# Patient Record
Sex: FEMALE | Race: WHITE | NOT HISPANIC OR LATINO | Employment: OTHER | URBAN - METROPOLITAN AREA
[De-identification: names, ages, dates, MRNs, and addresses within clinical notes are randomized per-mention and may not be internally consistent; named-entity substitution may affect disease eponyms.]

---

## 2017-01-06 DIAGNOSIS — J30.2 SEASONAL ALLERGIC RHINITIS, UNSPECIFIED ALLERGIC RHINITIS TRIGGER: Primary | ICD-10-CM

## 2017-01-06 RX ORDER — FLUTICASONE PROPIONATE 50 MCG
2 SPRAY, SUSPENSION (ML) NASAL DAILY
Qty: 16 G | Refills: 2 | Status: SHIPPED | OUTPATIENT
Start: 2017-01-06 | End: 2017-04-04

## 2017-01-06 NOTE — TELEPHONE ENCOUNTER
Diagnosis J30.2 copied    Pending Prescriptions:                       Disp   Refills    fluticasone (FLONASE) 50 MCG/ACT spray    17 g   1            Sig: Spray 2 sprays into both nostrils daily          Last Written Prescription Date: 6-14-16  Last Fill Quantity: 1 bottle,  # refills: 1   Last Office Visit with [facility], P or [facility] prescribing provider: 10-31-16                                             RT Sohail HutchinsR)

## 2017-01-06 NOTE — TELEPHONE ENCOUNTER
Pres[place] per [facility] [place].  Tanja Hu RN

## 2017-01-12 ENCOUNTER — DOCUMENTATION ONLY (OUTPATIENT)
Dept: SLEEP MEDICINE | Facility: CLINIC | Age: 62
End: 2017-01-12

## 2017-01-12 NOTE — PROGRESS NOTES
Patient was offered choice of vendor and chose [facility].  Patient Cristina Milton was set up at [place] on January 12, 2017. Patient received a Debbie [employer] DreamStation Auto. Pressures were set at 5-15 cm H2O.   Patient s ramp is 5 cm H2O for Off and FLEX/EPR is 2.  Patient received a Resmed Mask name: AIRFIT F20  Nasal mask Size Medium, heated tubing and heated humidifier.  Patient is enrolled in the STM Program and does not need to meet compliance. Patient has a follow up on 3/2/17 with Dr. Slater.    Kierra Fernandez

## 2017-01-16 ENCOUNTER — DOCUMENTATION ONLY (OUTPATIENT)
Dept: SLEEP MEDICINE | Facility: CLINIC | Age: 62
End: 2017-01-16

## 2017-01-16 NOTE — PROGRESS NOTES
3 DAY STM VISIT    Patient contacted for 3 day STM visit  Subjective measures:  Things are going well.  No issues with pressure or comfort.    Some issues with congestion.  Discuss increase humidity.    Current settings:  EPAP Min Auto CPAP: 5 (CPAP Min Auto CPAP)       EPAP Max Auto CPAP: 15 (CPAP Max Auto CPAP)       Assessment:  Nightly usage over four hours.   Action plan: Pt to have f/u 14 day  STM visit.

## 2017-01-27 ENCOUNTER — DOCUMENTATION ONLY (OUTPATIENT)
Dept: SLEEP MEDICINE | Facility: CLINIC | Age: 62
End: 2017-01-27

## 2017-01-27 NOTE — PROGRESS NOTES
14 DAY [facility] VISIT    Message left for patient to return call     Assessment: Pt meeting objective benchmarks.     Action plan: Waiting for patient to return call.  and Pt to have 30 day STM visit.   Device settings:    EPAP Min Auto CPAP: 5 (CPAP Min Auto CPAP)    EPAP Max Auto CPAP: 15 (CPAP Max Auto CPAP)    Avg EPAP pressure (90th %ile) 14 day average (Debbie): 9.2cm H20    Objective measures: 14 day rolling measures       Compliance   (Goal >70%)  --% compliance greater than four hours rolling average 14 days: 85.7 %      Leak   (Goal < 10%)  --Average % of night in large leak Rolling Average 14 days (DEBBIE): 2%  last data upload      AHI  (Goal < 5)  --AHI Rolling Average 14 Day: 3.05   last data upload       Usage  (Goal >240)  --Time mask on face 14 day average: 464 min

## 2017-01-30 DIAGNOSIS — G47.33 OSA (OBSTRUCTIVE SLEEP APNEA): Primary | ICD-10-CM

## 2017-01-30 NOTE — PROGRESS NOTES
Patient returned call.    Subjective measures:  Score  1 Pt feeling benefit from therapy.  She is having some air hunger when starting the night.      Order placed to provider to increase auto to 7-15 cm H20

## 2017-02-13 ENCOUNTER — DOCUMENTATION ONLY (OUTPATIENT)
Dept: SLEEP MEDICINE | Facility: CLINIC | Age: 62
End: 2017-02-13

## 2017-03-02 ENCOUNTER — OFFICE VISIT (OUTPATIENT)
Dept: SLEEP MEDICINE | Facility: CLINIC | Age: 62
End: 2017-03-02
Payer: COMMERCIAL

## 2017-03-02 VITALS
SYSTOLIC BLOOD PRESSURE: 127 MMHG | HEART RATE: 69 BPM | OXYGEN SATURATION: 94 % | WEIGHT: 257.4 LBS | BODY MASS INDEX: 39.01 KG/M2 | DIASTOLIC BLOOD PRESSURE: 71 MMHG | HEIGHT: 68 IN | TEMPERATURE: 97.5 F

## 2017-03-02 DIAGNOSIS — G47.33 OSA (OBSTRUCTIVE SLEEP APNEA): Primary | ICD-10-CM

## 2017-03-02 PROCEDURE — 99213 OFFICE O/P EST LOW 20 MIN: CPT | Performed by: INTERNAL MEDICINE

## 2017-03-02 NOTE — MR AVS SNAPSHOT
After Visit Summary   3/2/2017    Cristina Milton    MRN: [id number]           Patient Information     Date Of Birth          1955        Visit Information        Provider Department      3/2/2017 2:30 PM Arnoldo Slater MD [facility]        Today's Diagnoses     ANGY (obstructive sleep apnea)    -  1      Care Instructions      Your BMI is Body mass index is 39.14 kg/(m^2).  Weight management is a personal decision.  If you are interested in exploring weight loss strategies, the following discussion covers the approaches that may be successful. Body mass index (BMI) is one way to tell whether you are at a healthy weight, overweight, or obese. It measures your weight in relation to your height.  A BMI of 18.5 to 24.9 is in the healthy range. A person with a BMI of 25 to 29.9 is considered overweight, and someone with a BMI of 30 or greater is considered obese. More than two-thirds of American adults are considered overweight or obese.  Being overweight or obese increases the risk for further weight gain. Excess weight may lead to heart disease and diabetes.  Creating and following plans for healthy eating and physical activity may help you improve your health.  Weight control is part of healthy lifestyle and includes exercise, emotional health, and healthy eating habits. Careful eating habits lifelong are the mainstay of weight control. Though there are significant health benefits from weight loss, long-term weight loss with diet alone may be very difficult to achieve- studies show long-term success with dietary management in less than 10% of people. Attaining a healthy weight may be especially difficult to achieve in those with severe obesity. In some cases, medications, devices and surgical management might be considered.  What can you do?  If you are overweight or obese and are interested in methods for weight loss, you should discuss this with your provider.     Consider  reducing daily calorie intake by 500 calories.     Keep a food journal.     Avoiding skipping meals, consider cutting portions instead.    Diet combined with exercise helps maintain muscle while optimizing fat loss. Strength training is particularly important for building and maintaining muscle mass. Exercise helps reduce stress, increase energy, and improves fitness. Increasing exercise without diet control, however, may not burn enough calories to loose weight.       Start walking three days a week 10-20 minutes at a time    Work towards walking thirty minutes five days a week     Eventually, increase the speed of your walking for 1-2 minutes at time    In addition, we recommend that you review healthy lifestyles and methods for weight loss available through the National Institutes of Health patient information sites:  http://win.niddk.nih.gov/publications/index.htm    And look into health and wellness programs that may be available through your health insurance provider, employer, local community center, or [employer].    Weight management plan: Patient was referred to their PCP to discuss a diet and exercise plan.            Follow-ups after your visit        Follow-up notes from your care team     Return in about 1 year (around 3/2/2018).      Who to contact     If you have questions or need follow up information about today's clinic visit or your schedule please contact [facility] directly at [phone].  Normal or non-critical lab and imaging results will be communicated to you by MyChart, letter or phone within 4 business days after the clinic has received the results. If you do not hear from us within 7 days, please contact the clinic through MyChart or phone. If you have a critical or abnormal lab result, we will notify you by phone as soon as possible.  Submit refill requests through [employer] or call your pharmacy and they will forward the refill request to us. Please allow 3  "business days for your refill to be completed.          Additional Information About Your Visit        MyChart Information     [employer] gives you secure access to your electronic health record. If you see a primary care provider, you can also send messages to your care team and make appointments. If you have questions, please call your primary care clinic.  If you do not have a primary care provider, please call [phone] and they will assist you.        Care EveryWhere ID     This is your Care EveryWhere ID. This could be used by other organizations to access your [place] medical records  [id number]        Your Vitals Were     Pulse Temperature Height Pulse Oximetry BMI (Body Mass Index)       69 97.5  F (36.4  C) (Oral) 1.727 m (5' 8\") 94% 39.14 kg/m2        Blood Pressure from Last 3 Encounters:   03/02/17 127/71   12/15/16 (!) 132/98   12/08/16 147/69    Weight from Last 3 Encounters:   03/02/17 116.8 kg (257 lb 6.4 oz)   12/08/16 117.8 kg (259 lb 9.6 oz)   10/31/16 117 kg (258 lb)              We Performed the Following     Comprehensive DME        Primary Care Provider Office Phone # Fax #    Mela ANGELA Link [phone]       [facility]    [address] [place] [facility]   [facility] [address]        Thank you!     Thank you for choosing [facility]  for your care. Our goal is always to provide you with excellent care. Hearing back from our patients is one way we can continue to improve our services. Please take a few minutes to complete the written survey that you may receive in the mail after your visit with us. Thank you!             Your Updated Medication List - Protect others around you: Learn how to safely use, store and throw away your medicines at www.disposemymeds.org.          This list is accurate as of: 3/2/17  3:31 PM.  Always use your most recent med list.                   Brand Name Dispense Instructions for use    acetaminophen 325 MG " tablet    TYLENOL    100 tablet    Take 2 tablets (650 mg) by mouth every 6 hours       * albuterol (2.5 MG/3ML) 0.083% neb solution      Take 1 vial by nebulization every 6 hours as needed for shortness of breath / dyspnea       * albuterol 108 (90 BASE) MCG/ACT Inhaler    PROAIR HFA/PROVENTIL HFA/VENTOLIN HFA    1 Inhaler    Inhale 2 puffs into the lungs every 4 hours as needed for shortness of breath / dyspnea or wheezing       beclomethasone 80 MCG/ACT Inhaler    QVAR    3 Inhaler    Inhale 2 puffs into the lungs 2 times daily       Biotin 1 MG Caps          calcium-vitamin D 600-400 MG-UNIT per tablet    CALTRATE     Take 2 tablets by mouth daily       citalopram 40 MG tablet    celeXA    90 tablet    Take 1 tablet (40 mg) by mouth daily       fluticasone 50 MCG/ACT spray    FLONASE    16 g    Spray 2 sprays into both nostrils daily       glucosamine-chondroitin 500-400 MG Caps per capsule      Take 2 capsules by mouth daily       montelukast 10 MG tablet    SINGULAIR    90 tablet    Take 1 tablet (10 mg) by mouth At Bedtime       MULTIPLE VITAMIN PO      Take 1 tablet by mouth daily       nitroglycerin 0.4 MG sublingual tablet    NITROSTAT    25 tablet    Place 1 tablet (0.4 mg) under the tongue every 5 minutes as needed for chest pain call 911 if not gone       [place] [facility]      DREAMSTATION 7-15CM/H20 NASAL AIRFIT F20 M       PREDNISONE PO          Vitamin D-3 Super Strength 2000 UNITS tablet   Generic drug:  cholecalciferol      Take 2,000 Units by mouth       * Notice:  This list has 2 medication(s) that are the same as other medications prescribed for you. Read the directions carefully, and ask your doctor or other care provider to review them with you.

## 2017-03-02 NOTE — PATIENT INSTRUCTIONS

## 2017-03-02 NOTE — PROGRESS NOTES
Name: Cristina Milton MRN# [id number]   Age: 62 year old YOB: 1955     Date : March 2, 2017  Primary care provider: Mela Link           Chief Complaint:    Follow up of sleep apnea            History of Present Illness:     Cristina Milton is a 62 year old female with severe obstructive sleep apnea. Her home sleep study from 12/10/2016 showed apnea hypopnea index of 48.4 per hour. Lowest O2 saturation was 825 and there was a total of 26.1 minutes with saturation less than 88%.     Patient has been started on auto PAP therapy. He has used treatment regularly and reports significant positive benefits from its use. She wakes up feeling more refreshed and has improvement in her daytime energy and alertness.     Review of her CPAP download shows regular compliance with 93% of last 30 days with use greater than 4 hours. Average daily use is 7 hours 29 minutes. Residual AHI is 2.6 per hour.             Medications:     Current Outpatient Prescriptions   Medication Sig     order for DME DREAMSTATION  7-15CM/H20  NASAL AIRFIT F20 M     fluticasone (FLONASE) 50 MCG/ACT spray Spray 2 sprays into both nostrils daily     Biotin 1 MG CAPS      citalopram (CELEXA) 40 MG tablet Take 1 tablet (40 mg) by mouth daily     beclomethasone (QVAR) 80 MCG/ACT Inhaler Inhale 2 puffs into the lungs 2 times daily     montelukast (SINGULAIR) 10 MG tablet Take 1 tablet (10 mg) by mouth At Bedtime     albuterol (PROAIR HFA, PROVENTIL HFA, VENTOLIN HFA) 108 (90 BASE) MCG/ACT inhaler Inhale 2 puffs into the lungs every 4 hours as needed for shortness of breath / dyspnea or wheezing     PREDNISONE PO      nitroglycerin (NITROSTAT) 0.4 MG SL tablet Place 1 tablet (0.4 mg) under the tongue every 5 minutes as needed for chest pain call 911 if not gone     cholecalciferol (VITAMIN D-3 SUPER STRENGTH) 2000 UNITS tablet Take 2,000 Units by mouth     acetaminophen (TYLENOL) 325 MG tablet Take 2 tablets (650 mg) by mouth every 6  hours     albuterol (2.5 MG/3ML) 0.083% nebulizer solution Take 1 vial by nebulization every 6 hours as needed for shortness of breath / dyspnea     calcium-vitamin D (CALTRATE) 600-400 MG-UNIT per tablet Take 2 tablets by mouth daily     MULTIPLE VITAMIN PO Take 1 tablet by mouth daily     glucosamine-chondroitin 500-400 MG CAPS Take 2 capsules by mouth daily      No current facility-administered medications for this visit.         Allergies   Allergen Reactions     Cats      Dogs      Dust Mites      Mold      Nsaids      Gastric ulcer     Trees             Past Medical History:     Does not need 02 supplement at night   Past Medical History   Diagnosis Date     Allergic rhinitis due to pollen      Anxiety disorder      Arthritis      Asthma      Depression      since 1998 was on zoloft      Diaphragmatic hernia without mention of obstruction or gangrene      Disorder of bone and cartilage, unspecified      osteopenia     Headache(784.0)      Insomnia, unspecified      Lumbago      Malignant neoplasm of central portion of female breast (H)      Obesity, unspecified      Obstructive sleep apnea (adult) (pediatric)      Osteoarthrosis, unspecified whether generalized or localized, lower leg      Other diseases of lung, not elsewhere classified      PONV (postoperative nausea and vomiting)      vomiting     Recurrent sinus infections      Stomach ulcer      NSAID use     TMJ disease      Unspecified asthma(493.90)      Unspecified nasal polyp      Unspecified vitamin D deficiency              Past Surgical History:    No h/o  upper airway surgery  Past Surgical History   Procedure Laterality Date     Laparoscopic cholecystectomy  11/19/2012     Procedure: LAPAROSCOPIC CHOLECYSTECTOMY;  LAPAROSCOPIC CHOLECYSTECTOMY;  Surgeon: Jama Persaud MD;  Location: [facility]     Colonoscopy  2006,2008     Tonsillectomy       Ent surgery  2012     dilation esoph stricture     Breast surgery  1995     lumpectomy,early cancer      "Arthroplasty knee Right 2/17/2015     Procedure: ARTHROPLASTY KNEE;  Surgeon: Jermain Gonzalez MD;  Location:  OR     Gyn surgery  1999     hysterectomy with ooperectomy     Gyn surgery  1995     conization cervix knife/laser     Hysterectomy total abdominal       Colonoscopy N/A 12/15/2016     Procedure: COMBINED COLONOSCOPY, SINGLE OR MULTIPLE BIOPSY/POLYPECTOMY BY BIOPSY;  Surgeon: Gil Sanchez MD;  Location:  GI            Physical Examination:   /71 (BP Location: Right arm, Patient Position: Chair, Cuff Size: Adult Regular)  Pulse 69  Temp 97.5  F (36.4  C) (Oral)  Ht 1.727 m (5' 8\")  Wt 116.8 kg (257 lb 6.4 oz)  SpO2 94%  BMI 39.14 kg/m2            Assessment and Plan:     1. Severe obstructive sleep apnea, adequately treated on CPAP    - patient has adequate treatment of her sleep apnea on current CPAP therapy. She complains of some air hunger and wanted an increase in her min EPAP which was changed to 8 cm h2O.     - She was counseled regarding download findings and benefits of ongoing treatment of her severe sleep apnea.     Plan:     1. Continue auto PAP therapy 8-15 cm H2O  2. Follow up karan year        I spent a total of 15 minutes with patient with more than 50% in counseling       Arnoldo Slater MD, MD 3/2/2017       "

## 2017-03-02 NOTE — NURSING NOTE
"Chief Complaint   Patient presents with     Sleep Problem     CPAP follow        Initial /71 (BP Location: Right arm, Patient Position: Chair, Cuff Size: Adult Regular)  Pulse 69  Temp 97.5  F (36.4  C) (Oral)  Ht 1.727 m (5' 8\")  Wt 116.8 kg (257 lb 6.4 oz)  SpO2 94%  BMI 39.14 kg/m2 Estimated body mass index is 39.14 kg/(m^2) as calculated from the following:    Height as of this encounter: 1.727 m (5' 8\").    Weight as of this encounter: 116.8 kg (257 lb 6.4 oz).  Medication Reconciliation: complete     Julia Bates MA  [facility]      "

## 2017-04-02 ENCOUNTER — OFFICE VISIT (OUTPATIENT)
Dept: URGENT CARE | Facility: URGENT CARE | Age: 62
End: 2017-04-02
Payer: COMMERCIAL

## 2017-04-02 VITALS
OXYGEN SATURATION: 95 % | DIASTOLIC BLOOD PRESSURE: 60 MMHG | SYSTOLIC BLOOD PRESSURE: 110 MMHG | TEMPERATURE: 98.4 F | BODY MASS INDEX: 38.55 KG/M2 | HEART RATE: 85 BPM | HEIGHT: 68 IN | WEIGHT: 254.4 LBS

## 2017-04-02 DIAGNOSIS — R06.2 WHEEZING: ICD-10-CM

## 2017-04-02 DIAGNOSIS — R05.8 PRODUCTIVE COUGH: Primary | ICD-10-CM

## 2017-04-02 PROCEDURE — 94640 AIRWAY INHALATION TREATMENT: CPT | Performed by: PHYSICIAN ASSISTANT

## 2017-04-02 PROCEDURE — 99214 OFFICE O/P EST MOD 30 MIN: CPT | Mod: 25 | Performed by: PHYSICIAN ASSISTANT

## 2017-04-02 RX ORDER — ALBUTEROL SULFATE 0.83 MG/ML
1 SOLUTION RESPIRATORY (INHALATION) EVERY 6 HOURS PRN
Qty: 25 VIAL | Refills: 0 | Status: SHIPPED | OUTPATIENT
Start: 2017-04-02 | End: 2017-11-30

## 2017-04-02 RX ORDER — IPRATROPIUM BROMIDE AND ALBUTEROL SULFATE 2.5; .5 MG/3ML; MG/3ML
SOLUTION RESPIRATORY (INHALATION)
Qty: 1 VIAL | Refills: 0
Start: 2017-04-02 | End: 2017-04-13

## 2017-04-02 RX ORDER — PREDNISONE 20 MG/1
20 TABLET ORAL 2 TIMES DAILY
Qty: 10 TABLET | Refills: 0 | Status: SHIPPED | OUTPATIENT
Start: 2017-04-02 | End: 2017-04-13

## 2017-04-02 RX ORDER — DOXYCYCLINE HYCLATE 100 MG
100 TABLET ORAL 2 TIMES DAILY
Qty: 20 TABLET | Refills: 0 | Status: SHIPPED | OUTPATIENT
Start: 2017-04-02 | End: 2017-04-13

## 2017-04-02 NOTE — PROGRESS NOTES
SUBJECTIVE:   Cristina Milton is a 62 year old female presenting with a chief complaint of   1) productive cough for the past 6 days, worsening.  2) wheezing  3) nasal congestion  Onset of symptoms was as above.  Course of illness is worsening.    Severity moderate  Current and Associated symptoms: as above.  Chills, no fever noted  Treatment measures tried include albuterol nebulizer.  Predisposing factors include HX of asthma.    Past Medical History:   Diagnosis Date     Allergic rhinitis due to pollen      Anxiety disorder      Arthritis      Asthma      Depression     since 1998 was on zoloft      Diaphragmatic hernia without mention of obstruction or gangrene      Disorder of bone and cartilage, unspecified     osteopenia     Headache(784.0)      Insomnia, unspecified      Lumbago      Malignant neoplasm of central portion of female breast (H)      Obesity, unspecified      Obstructive sleep apnea (adult) (pediatric)      Osteoarthrosis, unspecified whether generalized or localized, lower leg      Other diseases of lung, not elsewhere classified      PONV (postoperative nausea and vomiting)     vomiting     Recurrent sinus infections      Stomach ulcer     NSAID use     TMJ disease      Unspecified asthma(493.90)      Unspecified nasal polyp      Unspecified vitamin D deficiency      Patient Active Problem List   Diagnosis     Arthritis of knee, right     Advanced directives, counseling/discussion     Asthma     Right shoulder pain     Depression     Obstructive sleep apnea (adult) (pediatric)     Tubular adenoma     Social History   Substance Use Topics     Smoking status: Never Smoker     Smokeless tobacco: Never Used     Alcohol use No      Comment: rare       ROS:  CONSTITUTIONAL:NEGATIVE for fever, chills, change in weight  INTEGUMENTARY/SKIN: NEGATIVE for worrisome rashes, moles or lesions  EYES: NEGATIVE for vision changes or irritation  ENT/MOUTH: as per HPI  RESP:as per HPI  CV: NEGATIVE for chest  "pain, palpitations or peripheral edema  GI: NEGATIVE for nausea, abdominal pain, heartburn, or change in bowel habits  MUSCULOSKELETAL: NEGATIVE for significant arthralgias or myalgia    OBJECTIVE  :/60  Pulse 85  Temp 98.4  F (36.9  C)  Ht 5' 8\" (1.727 m)  Wt 254 lb 6.4 oz (115.4 kg)  SpO2 95%  BMI 38.68 kg/m2  GENERAL APPEARANCE: healthy, alert and no distress  EYES: EOMI,  PERRL, conjunctiva clear  HENT: ear canals and TM's normal.  Nose and mouth without ulcers, erythema or lesions  NECK: supple, nontender, no lymphadenopathy  RESP: wheezing throughout which improves but does not clear with neb completely in clinic  CV: regular rates and rhythm, normal S1 S2, no murmur noted  ABDOMEN:  soft, nontender, no HSM or masses and bowel sounds normal  NEURO: Normal strength and tone, sensory exam grossly normal,  normal speech and mentation  SKIN: no suspicious lesions or rashes    (R05) Productive cough  (primary encounter diagnosis)  Comment:   Plan: doxycycline (VIBRA-TABS) 100 MG tablet            (R06.2) Wheezing  Comment:   Plan: INHALATION/NEBULIZER TREATMENT, INITIAL,         ipratropium - albuterol 0.5 mg/2.5 mg/3 mL         (DUONEB) 0.5-2.5 (3) MG/3ML neb solution,         predniSONE (DELTASONE) 20 MG tablet, albuterol         (2.5 MG/3ML) 0.083% neb solution          F/u with PCP for re-check within 2 weeks, sooner should symptoms persist or worsen.    Patient expresses understanding and agreement with the assessment and plan as above.      "

## 2017-04-02 NOTE — MR AVS SNAPSHOT
"              After Visit Summary   4/2/2017    Cristina Milton    MRN: [id number]           Patient Information     Date Of Birth          1955        Visit Information        Provider Department      4/2/2017 1:15 PM Lashae Contreras PA-C [facility]        Today's Diagnoses     Productive cough    -  1    Wheezing           Follow-ups after your visit        Who to contact     If you have questions or need follow up information about today's clinic visit or your schedule please contact [facility] directly at [phone].  Normal or non-critical lab and imaging results will be communicated to you by MyChart, letter or phone within 4 business days after the clinic has received the results. If you do not hear from us within 7 days, please contact the clinic through Narrativehart or phone. If you have a critical or abnormal lab result, we will notify you by phone as soon as possible.  Submit refill requests through [employer] or call your pharmacy and they will forward the refill request to us. Please allow 3 business days for your refill to be completed.          Additional Information About Your Visit        MyChart Information     [employer] gives you secure access to your electronic health record. If you see a primary care provider, you can also send messages to your care team and make appointments. If you have questions, please call your primary care clinic.  If you do not have a primary care provider, please call [phone] and they will assist you.        Care EveryWhere ID     This is your Care EveryWhere ID. This could be used by other organizations to access your [place] medical records  [id number]        Your Vitals Were     Pulse Temperature Height Pulse Oximetry BMI (Body Mass Index)       85 98.4  F (36.9  C) 5' 8\" (1.727 m) 95% 38.68 kg/m2        Blood Pressure from Last 3 Encounters:   04/02/17 110/60   03/02/17 127/71   12/15/16 (!) " 132/98    Weight from Last 3 Encounters:   04/02/17 254 lb 6.4 oz (115.4 kg)   03/02/17 257 lb 6.4 oz (116.8 kg)   12/08/16 259 lb 9.6 oz (117.8 kg)              We Performed the Following     INHALATION/NEBULIZER TREATMENT, INITIAL          Today's Medication Changes          These changes are accurate as of: 4/2/17  4:02 PM.  If you have any questions, ask your nurse or doctor.               Start taking these medicines.        Dose/Directions    doxycycline 100 MG tablet   Commonly known as:  VIBRA-TABS   Used for:  Productive cough   Started by:  Lashae Contreras PA-C        Dose:  100 mg   Take 1 tablet (100 mg) by mouth 2 times daily   Quantity:  20 tablet   Refills:  0       ipratropium - albuterol 0.5 mg/2.5 mg/3 mL 0.5-2.5 (3) MG/3ML neb solution   Commonly known as:  DUONEB   Used for:  Wheezing   Started by:  Lashae Contreras PA-C        One in neb in clinic   Quantity:  1 vial   Refills:  0       predniSONE 20 MG tablet   Commonly known as:  DELTASONE   Used for:  Wheezing   Started by:  Lashae Contreras PA-C        Dose:  20 mg   Take 1 tablet (20 mg) by mouth 2 times daily   Quantity:  10 tablet   Refills:  0         These medicines have changed or have updated prescriptions.        Dose/Directions    * albuterol (2.5 MG/3ML) 0.083% neb solution   This may have changed:  Another medication with the same name was added. Make sure you understand how and when to take each.        Dose:  1 vial   Take 1 vial by nebulization every 6 hours as needed for shortness of breath / dyspnea   Refills:  0       * albuterol 108 (90 BASE) MCG/ACT Inhaler   Commonly known as:  PROAIR HFA/PROVENTIL HFA/VENTOLIN HFA   This may have changed:  Another medication with the same name was added. Make sure you understand how and when to take each.   Used for:  Mild intermittent asthma without complication   Changed by:  Mela Link MD        Dose:  2 puff   Inhale 2 puffs into the lungs every  4 hours as needed for shortness of breath / dyspnea or wheezing   Quantity:  1 Inhaler   Refills:  3       * albuterol (2.5 MG/3ML) 0.083% neb solution   This may have changed:  You were already taking a medication with the same name, and this prescription was added. Make sure you understand how and when to take each.   Used for:  Wheezing   Changed by:  Lashae Contreras PA-C        Dose:  1 vial   Take 1 vial (2.5 mg) by nebulization every 6 hours as needed for shortness of breath / dyspnea or wheezing   Quantity:  25 vial   Refills:  0       * Notice:  This list has 3 medication(s) that are the same as other medications prescribed for you. Read the directions carefully, and ask your doctor or other care provider to review them with you.         Where to get your medicines      These medications were sent to [address] [address]     Phone:  [phone]     albuterol (2.5 MG/3ML) 0.083% neb solution    doxycycline 100 MG tablet    predniSONE 20 MG tablet         Some of these will need a paper prescription and others can be bought over the counter.  Ask your nurse if you have questions.     You don't need a prescription for these medications     ipratropium - albuterol 0.5 mg/2.5 mg/3 mL 0.5-2.5 (3) MG/3ML neb solution                Primary Care Provider Office Phone # Fax #    Mela Link [phone]       [place] [address] [address] [facility] [[email or web address]] [address]  [facility] [address]        Thank you!     Thank you for choosing [facility]  for your care. Our goal is always to provide you with excellent care. Hearing back from our patients is one way we can continue to improve our services. Please take a few minutes to complete the written survey that you may receive in the mail after your visit with us. Thank you!             Your Updated Medication List - Protect  others around you: Learn how to safely use, store and throw away your medicines at www.disposemymeds.org.          This list is accurate as of: 4/2/17  4:02 PM.  Always use your most recent med list.                   Brand Name Dispense Instructions for use    acetaminophen 325 MG tablet    TYLENOL    100 tablet    Take 2 tablets (650 mg) by mouth every 6 hours       * albuterol (2.5 MG/3ML) 0.083% neb solution      Take 1 vial by nebulization every 6 hours as needed for shortness of breath / dyspnea       * albuterol 108 (90 BASE) MCG/ACT Inhaler    PROAIR HFA/PROVENTIL HFA/VENTOLIN HFA    1 Inhaler    Inhale 2 puffs into the lungs every 4 hours as needed for shortness of breath / dyspnea or wheezing       * albuterol (2.5 MG/3ML) 0.083% neb solution     25 vial    Take 1 vial (2.5 mg) by nebulization every 6 hours as needed for shortness of breath / dyspnea or wheezing       beclomethasone 80 MCG/ACT Inhaler    QVAR    3 Inhaler    Inhale 2 puffs into the lungs 2 times daily       Biotin 1 MG Caps      Take by mouth as needed       calcium-vitamin D 600-400 MG-UNIT per tablet    CALTRATE     Take 2 tablets by mouth daily       citalopram 40 MG tablet    celeXA    90 tablet    Take 1 tablet (40 mg) by mouth daily       doxycycline 100 MG tablet    VIBRA-TABS    20 tablet    Take 1 tablet (100 mg) by mouth 2 times daily       fluticasone 50 MCG/ACT spray    FLONASE    16 g    Spray 2 sprays into both nostrils daily       glucosamine-chondroitin 500-400 MG Caps per capsule      Take 2 capsules by mouth daily       ipratropium - albuterol 0.5 mg/2.5 mg/3 mL 0.5-2.5 (3) MG/3ML neb solution    DUONEB    1 vial    One in neb in clinic       montelukast 10 MG tablet    SINGULAIR    90 tablet    Take 1 tablet (10 mg) by mouth At Bedtime       MULTIPLE VITAMIN PO      Take 1 tablet by mouth daily       nitroglycerin 0.4 MG sublingual tablet    NITROSTAT    25 tablet    Place 1 tablet (0.4 mg) under the tongue every 5  minutes as needed for chest pain call 911 if not gone       predniSONE 20 MG tablet    DELTASONE    10 tablet    Take 1 tablet (20 mg) by mouth 2 times daily       Vitamin D-3 Super Strength 2000 UNITS tablet   Generic drug:  cholecalciferol      Take 2,000 Units by mouth       * Notice:  This list has 3 medication(s) that are the same as other medications prescribed for you. Read the directions carefully, and ask your doctor or other care provider to review them with you.

## 2017-04-02 NOTE — LETTER
[place] [facility]  [address] [address]  [phone]      April 2, 2017    RE:  Cristina Milton                                                                                                                                                       [address] [place] [id number]            To whom it may concern:    Cristina Milton was seen in clinic today for illness.  She may return to work on Tuesday.            Sincerely,        Lashae Vergara [facility]

## 2017-04-04 DIAGNOSIS — J30.2 SEASONAL ALLERGIC RHINITIS, UNSPECIFIED ALLERGIC RHINITIS TRIGGER: ICD-10-CM

## 2017-04-05 RX ORDER — FLUTICASONE PROPIONATE 50 MCG
SPRAY, SUSPENSION (ML) NASAL
Qty: 16 ML | Refills: 5 | Status: SHIPPED | OUTPATIENT
Start: 2017-04-05 | End: 2017-09-20

## 2017-04-05 NOTE — TELEPHONE ENCOUNTER
fluticasone (FLONASE) 50 MCG/ACT spray        Last Written Prescription Date: 1/6/2017  Last Fill Quantity: 16g,  # refills: 2   Last Office Visit with [facility], [facility] or [facility] prescribing provider: 10/31/2016

## 2017-04-05 NOTE — TELEPHONE ENCOUNTER
Prescription approved per [facility], [facility] or [facility] refill protocol.  Caitie Lawson RN  [employer]

## 2017-04-13 ENCOUNTER — OFFICE VISIT (OUTPATIENT)
Dept: FAMILY MEDICINE | Facility: CLINIC | Age: 62
End: 2017-04-13
Payer: COMMERCIAL

## 2017-04-13 VITALS
RESPIRATION RATE: 18 BRPM | TEMPERATURE: 97.5 F | SYSTOLIC BLOOD PRESSURE: 123 MMHG | DIASTOLIC BLOOD PRESSURE: 86 MMHG | BODY MASS INDEX: 38.95 KG/M2 | HEIGHT: 68 IN | HEART RATE: 64 BPM | WEIGHT: 257 LBS | OXYGEN SATURATION: 96 %

## 2017-04-13 DIAGNOSIS — J45.20 MILD INTERMITTENT ASTHMA WITHOUT COMPLICATION: Primary | ICD-10-CM

## 2017-04-13 DIAGNOSIS — E66.01 MORBID OBESITY DUE TO EXCESS CALORIES (H): ICD-10-CM

## 2017-04-13 DIAGNOSIS — J06.9 UPPER RESPIRATORY TRACT INFECTION, UNSPECIFIED TYPE: ICD-10-CM

## 2017-04-13 PROCEDURE — 99213 OFFICE O/P EST LOW 20 MIN: CPT | Performed by: INTERNAL MEDICINE

## 2017-04-13 NOTE — PATIENT INSTRUCTIONS
Follow up at the time of physical  Seek sooner medical attention if there is any worsening of symptoms or problems.

## 2017-04-13 NOTE — PROGRESS NOTES
SUBJECTIVE:                                                    Cristina Milton is a 62 year old female who presents to clinic today for the following health issues:      ED/UC Followup:    Facility:  [facility]  Date of visit: 4/2/2017  Reason for visit: Cough, Wheezing  Current Status: remarkably improved     Reviewed 4/2/17  UC note   She just finished doxycycline and prednisone course yesterday   States her symptoms have resolved and she feels much better       ACT Total Scores 9/18/2015 10/31/2016   ACT TOTAL SCORE (Goal Greater than or Equal to 20) 14 18   In the past 12 months, how many times did you visit the emergency room for your asthma without being admitted to the hospital? 0 0   In the past 12 months, how many times were you hospitalized overnight because of your asthma? 0 0          Problem list and histories reviewed & adjusted, as indicated.  Additional history: as documented    Patient Active Problem List   Diagnosis     Arthritis of knee, right     Advanced directives, counseling/discussion     Asthma     Right shoulder pain     Depression     Obstructive sleep apnea (adult) (pediatric)     Tubular adenoma     Past Surgical History:   Procedure Laterality Date     ARTHROPLASTY KNEE Right 2/17/2015    Procedure: ARTHROPLASTY KNEE;  Surgeon: Jermain Gonzalez MD;  Location:  OR     BREAST SURGERY  1995    lumpectomy,early cancer     COLONOSCOPY  2006,2008     COLONOSCOPY N/A 12/15/2016    Procedure: COMBINED COLONOSCOPY, SINGLE OR MULTIPLE BIOPSY/POLYPECTOMY BY BIOPSY;  Surgeon: Gil Sanchez MD;  Location:  GI     ENT SURGERY  2012    dilation esoph stricture     GYN SURGERY  1999    hysterectomy with ooperectomy     GYN SURGERY  1995    conization cervix knife/laser     HYSTERECTOMY TOTAL ABDOMINAL       LAPAROSCOPIC CHOLECYSTECTOMY  11/19/2012    Procedure: LAPAROSCOPIC CHOLECYSTECTOMY;  LAPAROSCOPIC CHOLECYSTECTOMY;  Surgeon: Jama Persaud MD;  Location: [facility]      TONSILLECTOMY         Social History   Substance Use Topics     Smoking status: Never Smoker     Smokeless tobacco: Never Used     Alcohol use No      Comment: rare     Family History   Problem Relation Age of Onset     Prostate Cancer Father      Lymphoma Sister      Family History Negative Mother      Colon Cancer Maternal Grandfather      Chronic Obstructive Pulmonary Disease Brother      Lymphoma Maternal Grandmother      Breast Cancer No family hx of          Current Outpatient Prescriptions   Medication Sig Dispense Refill     fluticasone (FLONASE) 50 MCG/ACT spray SHAKE LIQUID AND USE 2 SPRAYS IN EACH NOSTRIL DAILY 16 mL 5     albuterol (2.5 MG/3ML) 0.083% neb solution Take 1 vial (2.5 mg) by nebulization every 6 hours as needed for shortness of breath / dyspnea or wheezing 25 vial 0     Biotin 1 MG CAPS Take by mouth as needed        citalopram (CELEXA) 40 MG tablet Take 1 tablet (40 mg) by mouth daily 90 tablet 3     beclomethasone (QVAR) 80 MCG/ACT Inhaler Inhale 2 puffs into the lungs 2 times daily 3 Inhaler 3     montelukast (SINGULAIR) 10 MG tablet Take 1 tablet (10 mg) by mouth At Bedtime 90 tablet 3     albuterol (PROAIR HFA, PROVENTIL HFA, VENTOLIN HFA) 108 (90 BASE) MCG/ACT inhaler Inhale 2 puffs into the lungs every 4 hours as needed for shortness of breath / dyspnea or wheezing 1 Inhaler 3     nitroglycerin (NITROSTAT) 0.4 MG SL tablet Place 1 tablet (0.4 mg) under the tongue every 5 minutes as needed for chest pain call 911 if not gone 25 tablet 0     cholecalciferol (VITAMIN D-3 SUPER STRENGTH) 2000 UNITS tablet Take 2,000 Units by mouth       acetaminophen (TYLENOL) 325 MG tablet Take 2 tablets (650 mg) by mouth every 6 hours 100 tablet 0     albuterol (2.5 MG/3ML) 0.083% nebulizer solution Take 1 vial by nebulization every 6 hours as needed for shortness of breath / dyspnea       calcium-vitamin D (CALTRATE) 600-400 MG-UNIT per tablet Take 2 tablets by mouth daily       MULTIPLE VITAMIN PO  "Take 1 tablet by mouth daily       glucosamine-chondroitin 500-400 MG CAPS Take 2 capsules by mouth daily        Allergies   Allergen Reactions     Cats      Dogs      Dust Mites      Mold      Nsaids      Gastric ulcer     Trees        ROS:  Constitutional, neuro, ENT, endocrine, pulmonary, cardiac, gastrointestinal, genitourinary, musculoskeletal, integument and psychiatric systems are negative, except as otherwise noted.    This document serves as a record of the services and decisions personally performed and made by Mela Link MD. It was created on her behalf by Kindra Singh, a trained medical scribe. The creation of this document is based the provider's statements to the medical scribe.    Scrraz Singh 4:50 PM, April 13, 2017    OBJECTIVE:                                                    /86 (BP Location: Right arm, Patient Position: Right side, Cuff Size: Adult Large)  Pulse 64  Temp 97.5  F (36.4  C) (Oral)  Resp 18  Ht 5' 8\" (1.727 m)  Wt 257 lb (116.6 kg)  SpO2 96%  BMI 39.08 kg/m2  Body mass index is 39.08 kg/(m^2).    GENERAL APPEARANCE: obese, alert and no distress  EYES: Eyes grossly normal to inspection, PERRL and conjunctivae and sclerae normal  HENT: ear canals and TM's normal and nose and mouth without ulcers or lesions  NECK: no adenopathy  RESP: lungs clear to auscultation - no rales, rhonchi or wheezes  CV: regular rates and rhythm, normal S1 S2, no S3       ASSESSMENT/PLAN:                                                    Cristina was seen today for er f/u.    Diagnoses and all orders for this visit:    Mild intermittent asthma without complication  Improved. Her ACT score today was 9 due to recent exacerbation but patient states she is feeling very well. Has not been using rescue inhaler   Using qvar  Discussed with patient about the use of this    Upper respiratory tract infection, unspecified type  Resolved. Doing well     Morbid obesity due to excess calories " (H)  Discussed importance of healthy diet and exercise     Patient Instructions   Follow up at the time of physical  Seek sooner medical attention if there is any worsening of symptoms or problems.    The information in this document, created by the medical scribe for me, accurately reflects the services I personally performed and the decisions made by me. I have reviewed and approved this document for accuracy prior to leaving the patient care area.  Mela Link MD  4:56 PM, 04/13/17    Mela Lnik MD  [facility]

## 2017-04-13 NOTE — MR AVS SNAPSHOT
After Visit Summary   4/13/2017    Cristina Milton    MRN: [id number]           Patient Information     Date Of Birth          1955        Visit Information        Provider Department      4/13/2017 4:30 PM Mela Link MD [facility]        Today's Diagnoses     Mild intermittent asthma without complication    -  1    Upper respiratory tract infection, unspecified type        Morbid obesity due to excess calories (H)          Care Instructions    Follow up at the time of physical  Seek sooner medical attention if there is any worsening of symptoms or problems.          Follow-ups after your visit        Who to contact     If you have questions or need follow up information about today's clinic visit or your schedule please contact [facility] directly at [phone].  Normal or non-critical lab and imaging results will be communicated to you by [employer]or[employer], letter or phone within 4 business days after the clinic has received the results. If you do not hear from us within 7 days, please contact the clinic through [employer] or phone. If you have a critical or abnormal lab result, we will notify you by phone as soon as possible.  Submit refill requests through [employer] or call your pharmacy and they will forward the refill request to us. Please allow 3 business days for your refill to be completed.          Additional Information About Your Visit        MyChart Information     [employer] gives you secure access to your electronic health record. If you see a primary care provider, you can also send messages to your care team and make appointments. If you have questions, please call your primary care clinic.  If you do not have a primary care provider, please call [phone] and they will assist you.        Care EveryWhere ID     This is your Care EveryWhere ID. This could be used by other organizations to access your [place] medical records  [id number]        Your Vitals Were  "    Pulse Temperature Respirations Height Pulse Oximetry BMI (Body Mass Index)    64 97.5  F (36.4  C) (Oral) 18 5' 8\" (1.727 m) 96% 39.08 kg/m2       Blood Pressure from Last 3 Encounters:   04/13/17 123/86   04/02/17 110/60   03/02/17 127/71    Weight from Last 3 Encounters:   04/13/17 257 lb (116.6 kg)   04/02/17 254 lb 6.4 oz (115.4 kg)   03/02/17 257 lb 6.4 oz (116.8 kg)              Today, you had the following     No orders found for display       Primary Care Provider Office Phone # Fax #    Mela Link [phone]       [facility]    [address] [facility] [address]  [facility] [address]        Thank you!     Thank you for choosing [facility]  for your care. Our goal is always to provide you with excellent care. Hearing back from our patients is one way we can continue to improve our services. Please take a few minutes to complete the written survey that you may receive in the mail after your visit with us. Thank you!             Your Updated Medication List - Protect others around you: Learn how to safely use, store and throw away your medicines at www.disposemymeds.org.          This list is accurate as of: 4/13/17  4:54 PM.  Always use your most recent med list.                   Brand Name Dispense Instructions for use    acetaminophen 325 MG tablet    TYLENOL    100 tablet    Take 2 tablets (650 mg) by mouth every 6 hours       * albuterol (2.5 MG/3ML) 0.083% neb solution      Take 1 vial by nebulization every 6 hours as needed for shortness of breath / dyspnea       * albuterol 108 (90 BASE) MCG/ACT Inhaler    PROAIR HFA/PROVENTIL HFA/VENTOLIN HFA    1 Inhaler    Inhale 2 puffs into the lungs every 4 hours as needed for shortness of breath / dyspnea or wheezing       * albuterol (2.5 MG/3ML) 0.083% neb solution     25 vial    Take 1 vial (2.5 mg) by nebulization every 6 hours as needed for shortness of breath / dyspnea or wheezing       beclomethasone 80 " MCG/ACT Inhaler    QVAR    3 Inhaler    Inhale 2 puffs into the lungs 2 times daily       Biotin 1 MG Caps      Take by mouth as needed       calcium-vitamin D 600-400 MG-UNIT per tablet    CALTRATE     Take 2 tablets by mouth daily       citalopram 40 MG tablet    celeXA    90 tablet    Take 1 tablet (40 mg) by mouth daily       fluticasone 50 MCG/ACT spray    FLONASE    16 mL    SHAKE LIQUID AND USE 2 SPRAYS IN EACH NOSTRIL DAILY       glucosamine-chondroitin 500-400 MG Caps per capsule      Take 2 capsules by mouth daily       montelukast 10 MG tablet    SINGULAIR    90 tablet    Take 1 tablet (10 mg) by mouth At Bedtime       MULTIPLE VITAMIN PO      Take 1 tablet by mouth daily       nitroglycerin 0.4 MG sublingual tablet    NITROSTAT    25 tablet    Place 1 tablet (0.4 mg) under the tongue every 5 minutes as needed for chest pain call 911 if not gone       Vitamin D-3 Super Strength 2000 UNITS tablet   Generic drug:  cholecalciferol      Take 2,000 Units by mouth       * Notice:  This list has 3 medication(s) that are the same as other medications prescribed for you. Read the directions carefully, and ask your doctor or other care provider to review them with you.

## 2017-04-13 NOTE — NURSING NOTE
"Chief Complaint   Patient presents with     ER F/U       Initial /86 (BP Location: Right arm, Patient Position: Right side, Cuff Size: Adult Large)  Pulse 64  Temp 97.5  F (36.4  C) (Oral)  Resp 18  Ht 5' 8\" (1.727 m)  Wt 257 lb (116.6 kg)  SpO2 96%  BMI 39.08 kg/m2 Estimated body mass index is 39.08 kg/(m^2) as calculated from the following:    Height as of this encounter: 5' 8\" (1.727 m).    Weight as of this encounter: 257 lb (116.6 kg).  Medication Reconciliation: complete   Mary Kate Robles CMA (AAMA)      "

## 2017-04-14 ASSESSMENT — ASTHMA QUESTIONNAIRES: ACT_TOTALSCORE: 9

## 2017-04-14 ASSESSMENT — PATIENT HEALTH QUESTIONNAIRE - PHQ9: SUM OF ALL RESPONSES TO PHQ QUESTIONS 1-9: 4

## 2017-08-11 ENCOUNTER — TELEPHONE (OUTPATIENT)
Dept: FAMILY MEDICINE | Facility: CLINIC | Age: 62
End: 2017-08-11

## 2017-08-11 DIAGNOSIS — J45.901 ASTHMA EXACERBATION: Primary | ICD-10-CM

## 2017-08-11 RX ORDER — PREDNISONE 10 MG/1
TABLET ORAL
Qty: 30 TABLET | Refills: 0 | Status: SHIPPED | OUTPATIENT
Start: 2017-08-11 | End: 2017-10-01

## 2017-08-11 NOTE — TELEPHONE ENCOUNTER
Please triage this patient and see how bad her symptoms are?  I have full schedule this afternoon but if needed I can accommodate her at 4:15 pm and she can check in at 4 .  Dr.Nasima Nikolay MD

## 2017-08-11 NOTE — TELEPHONE ENCOUNTER
TO PCP:  Please see below message.  Would you like to see patient this afternoon?  Thank you.  Helen Wilcox RN

## 2017-08-11 NOTE — TELEPHONE ENCOUNTER
"PCP:    Pt is requesting a \"boost of prednisone\" to treat a current asthma exasperation.   Pt reports worsening of her baseline asthma symptoms over the last 3 weeks.   Reports needing to use her rescue inhaler more often than usual. Is providing relief, but is short lived.   Using nebulizer as well. Denies any current SOB, however will come and go. Denies any dizziness or lightheadedness.     Was seen in April for asthma exasperation as well, was prescribed prednisone taper which worked well.     Please advise. Pharmacy is pended if needed.     Cookie Montenegro RN        "

## 2017-08-11 NOTE — TELEPHONE ENCOUNTER
Reason for Call:  Other prescription    Detailed comments: Pt called this morning and would like Dr. Link to put an order in for some pregnazone medication that will help her with her asthma because she is having difficulty with it right now. Please give pt a call once this prescription is ready. Thank you. **ASK FOR CHELITA WHEN YOU CALL**    Phone Number Patient can be reached at: Cell number on file:    [phone]       Best Time:     Can we leave a detailed message on this number? YES    Call taken on 8/11/2017 at 11:25 AM by Kena Barahona

## 2017-08-11 NOTE — TELEPHONE ENCOUNTER
Called and notified the pt of below.   Advised her to monitor for SOB, fever, dizziness/lighheadedness, or general worsening of asthma symptoms.   Advised to call into [facility] over the weekend if symptoms do not begin to improve.   Pt expressed understanding and agrees with plan of care.     Cookie Montenegro RN

## 2017-08-16 ENCOUNTER — RADIANT APPOINTMENT (OUTPATIENT)
Dept: GENERAL RADIOLOGY | Facility: CLINIC | Age: 62
End: 2017-08-16
Attending: NURSE PRACTITIONER
Payer: COMMERCIAL

## 2017-08-16 ENCOUNTER — OFFICE VISIT (OUTPATIENT)
Dept: FAMILY MEDICINE | Facility: CLINIC | Age: 62
End: 2017-08-16
Payer: COMMERCIAL

## 2017-08-16 VITALS
DIASTOLIC BLOOD PRESSURE: 74 MMHG | TEMPERATURE: 99.2 F | HEIGHT: 68 IN | OXYGEN SATURATION: 95 % | HEART RATE: 78 BPM | WEIGHT: 259.8 LBS | BODY MASS INDEX: 39.37 KG/M2 | SYSTOLIC BLOOD PRESSURE: 120 MMHG

## 2017-08-16 DIAGNOSIS — R05.9 COUGH: Primary | ICD-10-CM

## 2017-08-16 DIAGNOSIS — J45.20 MILD INTERMITTENT ASTHMA WITHOUT COMPLICATION: ICD-10-CM

## 2017-08-16 DIAGNOSIS — R05.9 COUGH: ICD-10-CM

## 2017-08-16 PROCEDURE — 99213 OFFICE O/P EST LOW 20 MIN: CPT | Performed by: NURSE PRACTITIONER

## 2017-08-16 PROCEDURE — 71020 XR CHEST 2 VW: CPT

## 2017-08-16 RX ORDER — DOXYCYCLINE 100 MG/1
100 CAPSULE ORAL 2 TIMES DAILY
Qty: 14 CAPSULE | Refills: 0 | Status: SHIPPED | OUTPATIENT
Start: 2017-08-16 | End: 2017-08-23

## 2017-08-16 RX ORDER — MONTELUKAST SODIUM 10 MG/1
10 TABLET ORAL AT BEDTIME
Qty: 90 TABLET | Refills: 3 | Status: SHIPPED | OUTPATIENT
Start: 2017-08-16 | End: 2018-06-29

## 2017-08-16 NOTE — PROGRESS NOTES
SUBJECTIVE:                                                    Cristina Milton is a 62 year old female who presents to clinic today for the following health issues:      RESPIRATORY SYMPTOMS      Duration: 4 weeks    Description  rhinorrhea, facial pain/pressure, cough, wheezing, headache, fatigue/malaise and myalgias    Severity: moderate    Accompanying signs and symptoms: None    History (predisposing factors):  Works with [employer]     Precipitating or alleviating factors: None  Therapies tried and outcome:  rest and fluids nasal spray/wash - Neb      Nagging cough for 4 weeks   She has a history of repeated coughing issues.  Had prednisone last week with mild improvement   Using nebs and inhalers   Continuing with wheezing   No fevers       Past Medical History:   Diagnosis Date     Allergic rhinitis due to pollen      Anxiety disorder      Arthritis      Asthma      Depression     since 1998 was on zoloft      Diaphragmatic hernia without mention of obstruction or gangrene      Disorder of bone and cartilage, unspecified     osteopenia     Headache(784.0)      Insomnia, unspecified      Lumbago      Malignant neoplasm of central portion of female breast (H)      Obesity, unspecified      Obstructive sleep apnea (adult) (pediatric)      Osteoarthrosis, unspecified whether generalized or localized, lower leg      Other diseases of lung, not elsewhere classified      PONV (postoperative nausea and vomiting)     vomiting     Recurrent sinus infections      Stomach ulcer     NSAID use     TMJ disease      Unspecified asthma(493.90)      Unspecified nasal polyp      Unspecified vitamin D deficiency      Past Surgical History:   Procedure Laterality Date     ARTHROPLASTY KNEE Right 2/17/2015    Procedure: ARTHROPLASTY KNEE;  Surgeon: Jermain Gonzalez MD;  Location: [facility]     BREAST SURGERY  1995    lumpectomy,early cancer     COLONOSCOPY  2006,2008     COLONOSCOPY N/A 12/15/2016    Procedure:  COMBINED COLONOSCOPY, SINGLE OR MULTIPLE BIOPSY/POLYPECTOMY BY BIOPSY;  Surgeon: Gil Sanchez MD;  Location:  GI     ENT SURGERY  2012    dilation esoph stricture     GYN SURGERY  1999    hysterectomy with ooperectomy     GYN SURGERY  1995    conization cervix knife/laser     HYSTERECTOMY TOTAL ABDOMINAL       LAPAROSCOPIC CHOLECYSTECTOMY  11/19/2012    Procedure: LAPAROSCOPIC CHOLECYSTECTOMY;  LAPAROSCOPIC CHOLECYSTECTOMY;  Surgeon: Jama Persaud MD;  Location: [facility]     TONSILLECTOMY       Social History   Substance Use Topics     Smoking status: Never Smoker     Smokeless tobacco: Never Used     Alcohol use 0.0 oz/week     0 Standard drinks or equivalent per week      Comment: rare     Current Outpatient Prescriptions   Medication Sig Dispense Refill     montelukast (SINGULAIR) 10 MG tablet Take 1 tablet (10 mg) by mouth At Bedtime 90 tablet 3     doxycycline Monohydrate 100 MG CAPS Take 1 capsule (100 mg) by mouth 2 times daily for 7 days 14 capsule 0     predniSONE (DELTASONE) 10 MG tablet 4 tabs daily for 3 days, then 3 tabs daily for 3 days, then 2 tabs daily for 3 days, then 1 tab daily for 3 days, then stop 30 tablet 0     fluticasone (FLONASE) 50 MCG/ACT spray SHAKE LIQUID AND USE 2 SPRAYS IN EACH NOSTRIL DAILY 16 mL 5     albuterol (2.5 MG/3ML) 0.083% neb solution Take 1 vial (2.5 mg) by nebulization every 6 hours as needed for shortness of breath / dyspnea or wheezing 25 vial 0     Biotin 1 MG CAPS Take by mouth daily        citalopram (CELEXA) 40 MG tablet Take 1 tablet (40 mg) by mouth daily 90 tablet 3     beclomethasone (QVAR) 80 MCG/ACT Inhaler Inhale 2 puffs into the lungs 2 times daily 3 Inhaler 3     albuterol (PROAIR HFA, PROVENTIL HFA, VENTOLIN HFA) 108 (90 BASE) MCG/ACT inhaler Inhale 2 puffs into the lungs every 4 hours as needed for shortness of breath / dyspnea or wheezing 1 Inhaler 3     nitroglycerin (NITROSTAT) 0.4 MG SL tablet Place 1 tablet (0.4 mg) under the tongue every 5  "minutes as needed for chest pain call 911 if not gone 25 tablet 0     cholecalciferol (VITAMIN D-3 SUPER STRENGTH) 2000 UNITS tablet Take 2,000 Units by mouth       acetaminophen (TYLENOL) 325 MG tablet Take 2 tablets (650 mg) by mouth every 6 hours 100 tablet 0     calcium-vitamin D (CALTRATE) 600-400 MG-UNIT per tablet Take 2 tablets by mouth daily       MULTIPLE VITAMIN PO Take 1 tablet by mouth daily       glucosamine-chondroitin 500-400 MG CAPS Take 2 capsules by mouth daily        Allergies   Allergen Reactions     Cats      Dogs      Dust Mites      Mold      Nsaids      Gastric ulcer     Trees        Reviewed and updated as needed this visit by clinical staff and provider        Review of Systems  Detailed as above       /74 (BP Location: Right arm, Patient Position: Chair, Cuff Size: Adult Large)  Pulse 78  Temp 99.2  F (37.3  C) (Oral)  Ht 5' 8\" (1.727 m)  Wt 259 lb 12.8 oz (117.8 kg)  SpO2 95%  BMI 39.5 kg/m2      Physical Exam   Constitutional: She is oriented to person, place, and time. She appears well-developed.   HENT:   Head: Normocephalic.   Right Ear: Tympanic membrane, external ear and ear canal normal.   Left Ear: Tympanic membrane, external ear and ear canal normal.   Mouth/Throat: Oropharynx is clear and moist. No oropharyngeal exudate.   Eyes: Conjunctivae are normal.   Neck: Normal range of motion.   Cardiovascular: Normal rate, regular rhythm and normal heart sounds.    No murmur heard.  Pulmonary/Chest: Effort normal and breath sounds normal. No respiratory distress.   Wheezy cough   Lymphadenopathy:     She has no cervical adenopathy.   Neurological: She is alert and oriented to person, place, and time.   Skin: Skin is warm and dry.   Psychiatric: She has a normal mood and affect. Judgment normal.       Assessment and Plan:       ICD-10-CM    1. Cough R05 XR Chest 2 Views     doxycycline Monohydrate 100 MG CAPS   2. Mild intermittent asthma without complication J45.20 " montelukast (SINGULAIR) 10 MG tablet       CXR without acute findings, read by me, awaiting rad read   We will get her back on the singulair. Will wait a couple days to see if this helps. If no improvement, then start doxy.   She should f/u with PCP in 3-4 weeks with no improvement or sooner with worsening symptoms       SABINO Hatfield, CNP  [facility]

## 2017-08-16 NOTE — MR AVS SNAPSHOT
"              After Visit Summary   8/16/2017    Cristina Milton    MRN: [id number]           Patient Information     Date Of Birth          1955        Visit Information        Provider Department      8/16/2017 3:30 PM Elysia Reynolds APRN CNP [facility]        Today's Diagnoses     Cough    -  1    Mild intermittent asthma without complication           Follow-ups after your visit        Who to contact     If you have questions or need follow up information about today's clinic visit or your schedule please contact [facility] directly at [phone].  Normal or non-critical lab and imaging results will be communicated to you by [facility], letter or phone within 4 business days after the clinic has received the results. If you do not hear from us within 7 days, please contact the clinic through [facility] or phone. If you have a critical or abnormal lab result, we will notify you by phone as soon as possible.  Submit refill requests through [employer] or call your pharmacy and they will forward the refill request to us. Please allow 3 business days for your refill to be completed.          Additional Information About Your Visit        MyChart Information     [employer] gives you secure access to your electronic health record. If you see a primary care provider, you can also send messages to your care team and make appointments. If you have questions, please call your primary care clinic.  If you do not have a primary care provider, please call [phone] and they will assist you.        Care EveryWhere ID     This is your Care EveryWhere ID. This could be used by other organizations to access your [facility] medical records  [id number]        Your Vitals Were     Pulse Temperature Height Pulse Oximetry BMI (Body Mass Index)       78 99.2  F (37.3  C) (Oral) 5' 8\" (1.727 m) 95% 39.5 kg/m2        Blood Pressure from Last 3 Encounters:   08/16/17 120/74   04/13/17 123/86   04/02/17 " 110/60    Weight from Last 3 Encounters:   08/16/17 259 lb 12.8 oz (117.8 kg)   04/13/17 257 lb (116.6 kg)   04/02/17 254 lb 6.4 oz (115.4 kg)                 Today's Medication Changes          These changes are accurate as of: 8/16/17 11:59 PM.  If you have any questions, ask your nurse or doctor.               Start taking these medicines.        Dose/Directions    doxycycline Monohydrate 100 MG Caps   Used for:  Cough   Started by:  Elysia Reynolds APRN CNP        Dose:  100 mg   Take 1 capsule (100 mg) by mouth 2 times daily for 7 days   Quantity:  14 capsule   Refills:  0         These medicines have changed or have updated prescriptions.        Dose/Directions    * albuterol 108 (90 BASE) MCG/ACT Inhaler   Commonly known as:  PROAIR HFA/PROVENTIL HFA/VENTOLIN HFA   This may have changed:  Another medication with the same name was removed. Continue taking this medication, and follow the directions you see here.   Used for:  Mild intermittent asthma without complication   Changed by:  Mela Link MD        Dose:  2 puff   Inhale 2 puffs into the lungs every 4 hours as needed for shortness of breath / dyspnea or wheezing   Quantity:  1 Inhaler   Refills:  3       * albuterol (2.5 MG/3ML) 0.083% neb solution   This may have changed:  Another medication with the same name was removed. Continue taking this medication, and follow the directions you see here.   Used for:  Wheezing   Changed by:  Lashae Contreras PA-C        Dose:  1 vial   Take 1 vial (2.5 mg) by nebulization every 6 hours as needed for shortness of breath / dyspnea or wheezing   Quantity:  25 vial   Refills:  0       * Notice:  This list has 2 medication(s) that are the same as other medications prescribed for you. Read the directions carefully, and ask your doctor or other care provider to review them with you.         Where to get your medicines      These medications were sent to [facility] [address] [place], [place] - [address] [place] [address]  [address] [place] [address] [place] [address]    Hours:  24-hours Phone:  [phone]     montelukast 10 MG tablet         Some of these will need a paper prescription and others can be bought over the counter.  Ask your nurse if you have questions.     Bring a paper prescription for each of these medications     doxycycline Monohydrate 100 MG Caps                Primary Care Provider Office Phone # Fax #    Mela Link [phone] [address]  [place]                [place] [address]        Equal Access to Services     [facility]: Hadii aad ku hadlissa Phelan, waaxda manju, qaybta kaalmada paula, joseluis hardy . So [facility] [phone].    ATENCIÓN: Si habla español, tiene a araiza disposición servicios gratuitos de asistencia lingüística. [facility] [phone].    We comply with applicable federal civil rights laws and [place] laws. We do not discriminate on the basis of race, color, national origin, age, disability sex, sexual orientation or gender identity.            Thank you!     Thank you for choosing [facility]  for your care. Our goal is always to provide you with excellent care. Hearing back from our patients is one way we can continue to improve our services. Please take a few minutes to complete the written survey that you may receive in the mail after your visit with us. Thank you!             Your Updated Medication List - Protect others around you: Learn how to safely use, store and throw away your medicines at www.disposemymeds.org.          This list is accurate as of: 8/16/17 11:59 PM.  Always use your most recent med list.                   Brand Name Dispense Instructions for use Diagnosis    acetaminophen 325 MG tablet    TYLENOL    100 tablet    Take 2 tablets (650 mg) by mouth every 6 hours    Arthritis of knee, right       * albuterol 108 (90 BASE) MCG/ACT Inhaler    PROAIR  HFA/PROVENTIL HFA/VENTOLIN HFA    1 Inhaler    Inhale 2 puffs into the lungs every 4 hours as needed for shortness of breath / dyspnea or wheezing    Mild intermittent asthma without complication       * albuterol (2.5 MG/3ML) 0.083% neb solution     25 vial    Take 1 vial (2.5 mg) by nebulization every 6 hours as needed for shortness of breath / dyspnea or wheezing    Wheezing       beclomethasone 80 MCG/ACT Inhaler    QVAR    3 Inhaler    Inhale 2 puffs into the lungs 2 times daily    Mild intermittent asthma without complication       Biotin 1 MG Caps      Take by mouth daily        calcium-vitamin D 600-400 MG-UNIT per tablet    CALTRATE     Take 2 tablets by mouth daily        citalopram 40 MG tablet    celeXA    90 tablet    Take 1 tablet (40 mg) by mouth daily    Recurrent major depressive disorder, in full remission (H)       doxycycline Monohydrate 100 MG Caps     14 capsule    Take 1 capsule (100 mg) by mouth 2 times daily for 7 days    Cough       fluticasone 50 MCG/ACT spray    FLONASE    16 mL    SHAKE LIQUID AND USE 2 SPRAYS IN EACH NOSTRIL DAILY    Seasonal allergic rhinitis, unspecified allergic rhinitis trigger       glucosamine-chondroitin 500-400 MG Caps per capsule      Take 2 capsules by mouth daily        montelukast 10 MG tablet    SINGULAIR    90 tablet    Take 1 tablet (10 mg) by mouth At Bedtime    Mild intermittent asthma without complication       MULTIPLE VITAMIN PO      Take 1 tablet by mouth daily        nitroGLYcerin 0.4 MG sublingual tablet    NITROSTAT    25 tablet    Place 1 tablet (0.4 mg) under the tongue every 5 minutes as needed for chest pain call 911 if not gone    Precordial pain       predniSONE 10 MG tablet    DELTASONE    30 tablet    4 tabs daily for 3 days, then 3 tabs daily for 3 days, then 2 tabs daily for 3 days, then 1 tab daily for 3 days, then stop    Asthma exacerbation       Vitamin D-3 Super Strength 2000 UNITS tablet   Generic drug:  cholecalciferol      Take  2,000 Units by mouth        * Notice:  This list has 2 medication(s) that are the same as other medications prescribed for you. Read the directions carefully, and ask your doctor or other care provider to review them with you.

## 2017-08-16 NOTE — NURSING NOTE
"Chief Complaint   Patient presents with     Sinus Problem       Initial /74 (BP Location: Right arm, Patient Position: Chair, Cuff Size: Adult Large)  Pulse 78  Temp 99.2  F (37.3  C) (Oral)  Ht 5' 8\" (1.727 m)  Wt 259 lb 12.8 oz (117.8 kg)  SpO2 95%  BMI 39.5 kg/m2 Estimated body mass index is 39.5 kg/(m^2) as calculated from the following:    Height as of this encounter: 5' 8\" (1.727 m).    Weight as of this encounter: 259 lb 12.8 oz (117.8 kg).  Medication Reconciliation: complete   Annie Pedraza MA  "

## 2017-08-17 NOTE — PROGRESS NOTES
JOÉS to PCP     Cristina  The radiologist noticed a mild abnormality of the chest xray that is of unknown cause, as you can see. I think we should continue as planned and have you start the antibiotic if you are not improved. I would follow up with Dr Link in about 1 month for a recheck no matter what, even if you are feeling better. Let me know if you have any questions   Elysia Reynolds NP

## 2017-09-11 DIAGNOSIS — F33.42 RECURRENT MAJOR DEPRESSIVE DISORDER, IN FULL REMISSION (H): ICD-10-CM

## 2017-09-12 RX ORDER — CITALOPRAM HYDROBROMIDE 40 MG/1
TABLET ORAL
Start: 2017-09-12

## 2017-09-12 NOTE — TELEPHONE ENCOUNTER
Pending Prescriptions:                       Disp   Refills    citalopram (CELEXA) 40 MG tablet [Pharmac*90 tab*0            Sig: TAKE 1 TABLET BY MOUTH EVERY DAY.         Last Written Prescription Date: 10/31/16  Last Fill Quantity: 90, # refills: 3  Last Office Visit with [facility] primary care provider:  4/13/17        Last PHQ-9 score on record=   PHQ-9 SCORE 4/13/2017   Total Score 4

## 2017-09-20 DIAGNOSIS — J30.2 SEASONAL ALLERGIC RHINITIS: ICD-10-CM

## 2017-09-21 RX ORDER — FLUTICASONE PROPIONATE 50 MCG
SPRAY, SUSPENSION (ML) NASAL
Qty: 48 ML | Refills: 1 | Status: SHIPPED | OUTPATIENT
Start: 2017-09-21 | End: 2018-03-21

## 2017-09-21 NOTE — TELEPHONE ENCOUNTER
Prescription approved per [facility] Refill Protocol.  Linda Vidales RN

## 2017-09-21 NOTE — TELEPHONE ENCOUNTER
Pending Prescriptions:                       Disp   Refills    fluticasone (FLONASE) 50 MCG/ACT spray [P*16 mL  0            Sig: SHAKE LIQUID AND USE 2 SPRAYS IN EACH NOSTRIL           DAILY          Last Written Prescription Date: 4/5/17  Last Fill Quantity: 16mL,  # refills: 5   Last Office Visit with [facility], P or [facility] prescribing provider: 4/13/17 RT Thor(R)

## 2017-10-01 ENCOUNTER — OFFICE VISIT (OUTPATIENT)
Dept: URGENT CARE | Facility: URGENT CARE | Age: 62
End: 2017-10-01
Payer: COMMERCIAL

## 2017-10-01 VITALS
SYSTOLIC BLOOD PRESSURE: 134 MMHG | WEIGHT: 261.5 LBS | TEMPERATURE: 100.3 F | BODY MASS INDEX: 39.76 KG/M2 | HEART RATE: 92 BPM | OXYGEN SATURATION: 95 % | DIASTOLIC BLOOD PRESSURE: 78 MMHG

## 2017-10-01 DIAGNOSIS — J45.41 MODERATE PERSISTENT ASTHMA WITH (ACUTE) EXACERBATION: Primary | ICD-10-CM

## 2017-10-01 PROCEDURE — 99214 OFFICE O/P EST MOD 30 MIN: CPT | Performed by: FAMILY MEDICINE

## 2017-10-01 RX ORDER — METHYLPREDNISOLONE 4 MG
4 TABLET, DOSE PACK ORAL SEE ADMIN INSTRUCTIONS
Qty: 21 TABLET | Refills: 0 | Status: SHIPPED | OUTPATIENT
Start: 2017-10-01 | End: 2017-11-30

## 2017-10-01 RX ORDER — ALBUTEROL SULFATE 0.83 MG/ML
1 SOLUTION RESPIRATORY (INHALATION) EVERY 6 HOURS PRN
Qty: 1 BOX | Refills: 0 | Status: SHIPPED | OUTPATIENT
Start: 2017-10-01 | End: 2017-12-04

## 2017-10-01 RX ORDER — ALBUTEROL SULFATE 90 UG/1
1-2 AEROSOL, METERED RESPIRATORY (INHALATION) EVERY 4 HOURS PRN
Qty: 1 INHALER | Refills: 0 | Status: SHIPPED | OUTPATIENT
Start: 2017-10-01 | End: 2017-11-30

## 2017-10-01 RX ORDER — DOXYCYCLINE 100 MG/1
100 CAPSULE ORAL 2 TIMES DAILY
Qty: 20 CAPSULE | Refills: 0 | Status: SHIPPED | OUTPATIENT
Start: 2017-10-01 | End: 2017-10-11

## 2017-10-01 NOTE — PATIENT INSTRUCTIONS
Controlling Your Asthma  You can do a lot to manage your asthma and improve your quality of life. You will need to work with your healthcare provider to develop a plan. But it s up to you to put this plan into action.  Why you need to take control  You need to control the inflammation in your lungs. Take all medicine as directed, especially controller medicines, even if you feel that your asthma is under good control. You also need to relieve symptoms when you have them. These are long-term tasks. But the more you stay in control, the better you ll feel. If you don t stay in control:    Asthma symptoms may cause you to miss school, work, or activities that you enjoy.    Asthma flare-ups can be dangerous, even deadly.    Uncontrolled asthma makes it more likely that you will need emergency department and in-hospital care.    Uncontrolled asthma may cause permanent damage to your lungs.    Peak flow monitoring helps measure how open your airways are.   Taking medicine helps you control your asthma and relieve symptoms when they occur.     Using an Asthma Action Plan will help you keep track of and respond to asthma symptoms.   Avoiding triggers--the things that inflame your airways--will help prevent symptoms and flare-ups.   Your action plan  Your healthcare provider will help you prepare, and when needed, update your personal Asthma Action Plan. Your plan tells you what to do based on your current symptoms. If you don't have an Asthma Action Plan, or if yours isn't up-to-date, make sure you talk with your healthcare provider.  Date Last Reviewed: 1/1/2017 2000-2017 [employer]. [address] [place] [address], [place] [address]. All rights reserved. This information is not intended as a substitute for professional medical care. Always follow your healthcare professional's instructions.

## 2017-10-01 NOTE — PROGRESS NOTES
Chief Complaint   Patient presents with     Sinus Problem     sinus drainage, nasal congestion, facial pressure, headache, cough with difficulty breathings, throat pain, pain in ears and chills for a few days.       Cristina Milton is a 62 year old female who presents for shortness of breath with wheezing that started 2 days ago.   A previous diagnosis of asthma was made concerning this patient on the basis of   symptoms and response to medications. Current symptoms include wheezing, productive cough with sputum described as yellow, dyspnea on exertion and chest tightness.   Precipitating factors uri.  Treatment   modalities employed to this point include albuterol / qvar inhaler.       Past Medical History:   Diagnosis Date     Allergic rhinitis due to pollen      Anxiety disorder      Arthritis      Asthma      Depression     since 1998 was on zoloft      Diaphragmatic hernia without mention of obstruction or gangrene      Disorder of bone and cartilage, unspecified     osteopenia     Headache(784.0)      Insomnia, unspecified      Lumbago      Malignant neoplasm of central portion of female breast (H)      Obesity, unspecified      Obstructive sleep apnea (adult) (pediatric)      Osteoarthrosis, unspecified whether generalized or localized, lower leg      Other diseases of lung, not elsewhere classified      PONV (postoperative nausea and vomiting)     vomiting     Recurrent sinus infections      Stomach ulcer     NSAID use     TMJ disease      Unspecified asthma(493.90)      Unspecified nasal polyp      Unspecified vitamin D deficiency        ALLERGIES:  Cats; Dogs; Dust mites; Mold; Nsaids; and Trees      Current Outpatient Prescriptions on File Prior to Visit:  fluticasone (FLONASE) 50 MCG/ACT spray SHAKE LIQUID AND USE 2 SPRAYS IN EACH NOSTRIL DAILY   montelukast (SINGULAIR) 10 MG tablet Take 1 tablet (10 mg) by mouth At Bedtime   albuterol (2.5 MG/3ML) 0.083% neb solution Take 1 vial (2.5 mg) by  nebulization every 6 hours as needed for shortness of breath / dyspnea or wheezing   Biotin 1 MG CAPS Take by mouth daily    citalopram (CELEXA) 40 MG tablet Take 1 tablet (40 mg) by mouth daily   beclomethasone (QVAR) 80 MCG/ACT Inhaler Inhale 2 puffs into the lungs 2 times daily   albuterol (PROAIR HFA, PROVENTIL HFA, VENTOLIN HFA) 108 (90 BASE) MCG/ACT inhaler Inhale 2 puffs into the lungs every 4 hours as needed for shortness of breath / dyspnea or wheezing   cholecalciferol (VITAMIN D-3 SUPER STRENGTH) 2000 UNITS tablet Take 2,000 Units by mouth   calcium-vitamin D (CALTRATE) 600-400 MG-UNIT per tablet Take 2 tablets by mouth daily   MULTIPLE VITAMIN PO Take 1 tablet by mouth daily   glucosamine-chondroitin 500-400 MG CAPS Take 2 capsules by mouth daily    nitroglycerin (NITROSTAT) 0.4 MG SL tablet Place 1 tablet (0.4 mg) under the tongue every 5 minutes as needed for chest pain call 911 if not gone (Patient not taking: Reported on 10/1/2017)   acetaminophen (TYLENOL) 325 MG tablet Take 2 tablets (650 mg) by mouth every 6 hours (Patient not taking: Reported on 10/1/2017)     No current facility-administered medications on file prior to visit.     Social History   Substance Use Topics     Smoking status: Never Smoker     Smokeless tobacco: Never Used     Alcohol use 0.0 oz/week     0 Standard drinks or equivalent per week      Comment: rare       Family History   Problem Relation Age of Onset     Prostate Cancer Father      Lymphoma Sister      Family History Negative Mother      Colon Cancer Maternal Grandfather      Chronic Obstructive Pulmonary Disease Brother      Lymphoma Maternal Grandmother      Breast Cancer No family hx of      ROS:  CONSTITUTIONAL:NEGATIVE for fever, chills, change in weight  INTEGUMENTARY/SKIN: NEGATIVE for worrisome rashes, moles or lesions  EYES: NEGATIVE for vision changes or irritation  ENT/MOUTH: NEGATIVE for ear, mouth and throat problems  GI: NEGATIVE for nausea, abdominal  pain, heartburn, or change in bowel habits      OBJECTIVE: Initial physical exam  /78  Pulse 92  Temp 100.3  F (37.9  C) (Oral)  Wt 261 lb 8 oz (118.6 kg)  SpO2 95%  Breastfeeding? No  BMI 39.76 kg/m2   GENERAL: alert, cooperative, flushed  SKIN: skin is clear, no rashes noted  HEAD: The head is normocephalic.   EYES: conjunctivae and cornea normal.without erythema or discharge  EARS: The canals are clear, tympanic membranes normal with no erythema/effusion.  NOSE: Clear, no discharge or congestion: THROAT: moist mucous membranes, no erythema.  NECK: The neck is supple, no masses or significant adenopathy noted  LUNGS: POSITIVE  for rhonchi bilaterally and expiratory wheezes bilateral  CV: regular rate and rhythm. S1 and S2 are normal. No murmurs.       ASSESSMENT:  Moderate persistent asthma with (acute) exacerbation      - albuterol (2.5 MG/3ML) 0.083% neb solution; Take 1 vial (2.5 mg) by nebulization every 6 hours as needed for shortness of breath / dyspnea or wheezing  - doxycycline (VIBRAMYCIN) 100 MG capsule; Take 1 capsule (100 mg) by mouth 2 times daily for 10 days  - methylPREDNISolone (MEDROL) 4 MG tablet; Take 1 tablet (4 mg) by mouth See Admin Instructions follow package directions  - albuterol (PROAIR HFA/PROVENTIL HFA/VENTOLIN HFA) 108 (90 BASE) MCG/ACT Inhaler; Inhale 1-2 puffs into the lungs every 4 hours as needed for shortness of breath / dyspnea or wheezing         Medication: continue current medication regimen unchanged        Discussed medication dosage, usage, side effects, and goals of   treatment in detail.     Avoidance of precipitants.    Return if worsening shortness of breath.    Follow-up with primary physician for chronic management of asthma symptoms    Patient Education: Instructed to return to urgent care or notify primary MD office of fever >101, blood in sputum, chest pain, dyspnea at rest, or other symptoms of concern to patient.

## 2017-10-01 NOTE — NURSING NOTE
"Chief Complaint   Patient presents with     Sinus Problem     sinus drainage, nasal congestion, facial pressure, headache, cough with difficulty breathings, throat pain, pain in ears and chills for a few days.         Initial /78  Pulse 92  Temp 100.3  F (37.9  C) (Oral)  Wt 261 lb 8 oz (118.6 kg)  SpO2 95%  Breastfeeding? No  BMI 39.76 kg/m2 Estimated body mass index is 39.76 kg/(m^2) as calculated from the following:    Height as of 8/16/17: 5' 8\" (1.727 m).    Weight as of this encounter: 261 lb 8 oz (118.6 kg).  Medication Reconciliation: complete    "

## 2017-11-30 ENCOUNTER — HOSPITAL ENCOUNTER (OUTPATIENT)
Dept: MAMMOGRAPHY | Facility: CLINIC | Age: 62
Discharge: HOME | End: 2017-11-30
Attending: INTERNAL MEDICINE | Admitting: INTERNAL MEDICINE
Payer: COMMERCIAL

## 2017-11-30 ENCOUNTER — OFFICE VISIT (OUTPATIENT)
Dept: FAMILY MEDICINE | Facility: CLINIC | Age: 62
End: 2017-11-30
Payer: COMMERCIAL

## 2017-11-30 VITALS
OXYGEN SATURATION: 95 % | SYSTOLIC BLOOD PRESSURE: 121 MMHG | TEMPERATURE: 98.3 F | BODY MASS INDEX: 39.1 KG/M2 | HEIGHT: 68 IN | HEART RATE: 77 BPM | DIASTOLIC BLOOD PRESSURE: 78 MMHG | WEIGHT: 258 LBS

## 2017-11-30 DIAGNOSIS — R10.13 DYSPEPSIA: ICD-10-CM

## 2017-11-30 DIAGNOSIS — D36.9 TUBULAR ADENOMA: ICD-10-CM

## 2017-11-30 DIAGNOSIS — M85.80 OSTEOPENIA, UNSPECIFIED LOCATION: ICD-10-CM

## 2017-11-30 DIAGNOSIS — J45.901 MODERATE ASTHMA WITH ACUTE EXACERBATION, UNSPECIFIED WHETHER PERSISTENT: ICD-10-CM

## 2017-11-30 DIAGNOSIS — F33.42 RECURRENT MAJOR DEPRESSIVE DISORDER, IN FULL REMISSION (H): ICD-10-CM

## 2017-11-30 DIAGNOSIS — Z23 NEED FOR PROPHYLACTIC VACCINATION AND INOCULATION AGAINST INFLUENZA: ICD-10-CM

## 2017-11-30 DIAGNOSIS — G47.33 OBSTRUCTIVE SLEEP APNEA: ICD-10-CM

## 2017-11-30 DIAGNOSIS — Z79.899 MEDICATION MANAGEMENT: ICD-10-CM

## 2017-11-30 DIAGNOSIS — E66.01 MORBID OBESITY (H): ICD-10-CM

## 2017-11-30 DIAGNOSIS — Z12.31 VISIT FOR SCREENING MAMMOGRAM: ICD-10-CM

## 2017-11-30 DIAGNOSIS — R06.02 SOB (SHORTNESS OF BREATH): ICD-10-CM

## 2017-11-30 DIAGNOSIS — Z00.00 ROUTINE HISTORY AND PHYSICAL EXAMINATION OF ADULT: Primary | ICD-10-CM

## 2017-11-30 LAB
BASOPHILS # BLD AUTO: 0 10E9/L (ref 0–0.2)
BASOPHILS NFR BLD AUTO: 0.2 %
DIFFERENTIAL METHOD BLD: ABNORMAL
EOSINOPHIL # BLD AUTO: 0.9 10E9/L (ref 0–0.7)
EOSINOPHIL NFR BLD AUTO: 10.3 %
ERYTHROCYTE [DISTWIDTH] IN BLOOD BY AUTOMATED COUNT: 13.5 % (ref 10–15)
HCT VFR BLD AUTO: 42.1 % (ref 35–47)
HGB BLD-MCNC: 14 G/DL (ref 11.7–15.7)
LYMPHOCYTES # BLD AUTO: 2.4 10E9/L (ref 0.8–5.3)
LYMPHOCYTES NFR BLD AUTO: 25.9 %
MCH RBC QN AUTO: 32 PG (ref 26.5–33)
MCHC RBC AUTO-ENTMCNC: 33.3 G/DL (ref 31.5–36.5)
MCV RBC AUTO: 96 FL (ref 78–100)
MONOCYTES # BLD AUTO: 0.8 10E9/L (ref 0–1.3)
MONOCYTES NFR BLD AUTO: 8.5 %
NEUTROPHILS # BLD AUTO: 5 10E9/L (ref 1.6–8.3)
NEUTROPHILS NFR BLD AUTO: 55.1 %
NT-PROBNP SERPL-MCNC: 33 PG/ML (ref 0–125)
PLATELET # BLD AUTO: 288 10E9/L (ref 150–450)
RBC # BLD AUTO: 4.37 10E12/L (ref 3.8–5.2)
WBC # BLD AUTO: 9.1 10E9/L (ref 4–11)

## 2017-11-30 PROCEDURE — 90686 IIV4 VACC NO PRSV 0.5 ML IM: CPT | Performed by: INTERNAL MEDICINE

## 2017-11-30 PROCEDURE — 94640 AIRWAY INHALATION TREATMENT: CPT | Performed by: INTERNAL MEDICINE

## 2017-11-30 PROCEDURE — 90471 IMMUNIZATION ADMIN: CPT | Performed by: INTERNAL MEDICINE

## 2017-11-30 PROCEDURE — 80050 GENERAL HEALTH PANEL: CPT | Performed by: INTERNAL MEDICINE

## 2017-11-30 PROCEDURE — 83880 ASSAY OF NATRIURETIC PEPTIDE: CPT | Performed by: INTERNAL MEDICINE

## 2017-11-30 PROCEDURE — G0202 SCR MAMMO BI INCL CAD: HCPCS

## 2017-11-30 PROCEDURE — 96372 THER/PROPH/DIAG INJ SC/IM: CPT | Performed by: INTERNAL MEDICINE

## 2017-11-30 PROCEDURE — 36415 COLL VENOUS BLD VENIPUNCTURE: CPT | Performed by: INTERNAL MEDICINE

## 2017-11-30 PROCEDURE — 99396 PREV VISIT EST AGE 40-64: CPT | Mod: 25 | Performed by: INTERNAL MEDICINE

## 2017-11-30 PROCEDURE — 99213 OFFICE O/P EST LOW 20 MIN: CPT | Mod: 25 | Performed by: INTERNAL MEDICINE

## 2017-11-30 RX ORDER — METHYLPREDNISOLONE SODIUM SUCCINATE 40 MG/ML
40 INJECTION, POWDER, LYOPHILIZED, FOR SOLUTION INTRAMUSCULAR; INTRAVENOUS ONCE
Qty: 1 ML | Refills: 0
Start: 2017-11-30 | End: 2017-11-30

## 2017-11-30 RX ORDER — ALBUTEROL SULFATE 0.83 MG/ML
1 SOLUTION RESPIRATORY (INHALATION) EVERY 6 HOURS PRN
Qty: 25 VIAL | Refills: 0 | Status: SHIPPED | OUTPATIENT
Start: 2017-11-30 | End: 2017-12-04

## 2017-11-30 RX ORDER — CITALOPRAM HYDROBROMIDE 40 MG/1
40 TABLET ORAL DAILY
Qty: 90 TABLET | Refills: 3 | Status: SHIPPED | OUTPATIENT
Start: 2017-11-30 | End: 2018-12-29

## 2017-11-30 RX ORDER — IPRATROPIUM BROMIDE AND ALBUTEROL SULFATE 2.5; .5 MG/3ML; MG/3ML
1 SOLUTION RESPIRATORY (INHALATION) ONCE
Qty: 3 ML | Refills: 0
Start: 2017-11-30 | End: 2017-12-04

## 2017-11-30 RX ORDER — PREDNISONE 10 MG/1
TABLET ORAL
Qty: 30 TABLET | Refills: 0 | Status: SHIPPED | OUTPATIENT
Start: 2017-11-30 | End: 2018-02-08

## 2017-11-30 RX ORDER — AMOXICILLIN AND CLAVULANATE POTASSIUM 500; 125 MG/1; MG/1
1 TABLET, FILM COATED ORAL 2 TIMES DAILY
Qty: 14 TABLET | Refills: 0 | Status: SHIPPED | OUTPATIENT
Start: 2017-11-30 | End: 2017-12-07

## 2017-11-30 ASSESSMENT — PATIENT HEALTH QUESTIONNAIRE - PHQ9: SUM OF ALL RESPONSES TO PHQ QUESTIONS 1-9: 9

## 2017-11-30 NOTE — PATIENT INSTRUCTIONS
Preventive Health Recommendations  Female Ages 50 - 64    Yearly exam: See your health care provider every year in order to  o Review health changes.   o Discuss preventive care.    o Review your medicines if your doctor has prescribed any.      Get a Pap test every three years (unless you have an abnormal result and your provider advises testing more often).    If you get Pap tests with HPV test, you only need to test every 5 years, unless you have an abnormal result.     You do not need a Pap test if your uterus was removed (hysterectomy) and you have not had cancer.    You should be tested each year for STDs (sexually transmitted diseases) if you're at risk.     Have a mammogram every 1 to 2 years.    Have a colonoscopy at age 50, or have a yearly FIT test (stool test). These exams screen for colon cancer.      Have a cholesterol test every 5 years, or more often if advised.    Have a diabetes test (fasting glucose) every three years. If you are at risk for diabetes, you should have this test more often.     If you are at risk for osteoporosis (brittle bone disease), think about having a bone density scan (DEXA).    Shots: Get a flu shot each year. Get a tetanus shot every 10 years.    Nutrition:     Eat at least 5 servings of fruits and vegetables each day.    Eat whole-grain bread, whole-wheat pasta and brown rice instead of white grains and rice.    Talk to your provider about Calcium and Vitamin D.     Lifestyle    Exercise at least 150 minutes a week (30 minutes a day, 5 days a week). This will help you control your weight and prevent disease.    Limit alcohol to one drink per day.    No smoking.     Wear sunscreen to prevent skin cancer.     See your dentist every six months for an exam and cleaning.    See your eye doctor every 1 to 2 years.    Solumedrol  shot today  Labs today  Take prednisone in a tapering dose 4 tablets for 3 days, then 3 tablets for 3 days, then 2 tablets for 3 days, then 1 tablet  daily for 3 days, then stop  Take prednisone in the morning as it may affect your sleep  Take prednisone with food  Use Advair twice daily  If it doesn't go through with insurance, use Qvar for now  Rinse mouth after using inhaler  Take Zantac twice a day while you're on prednisone and having asthma flare-ups  Come back to see me next Monday 12/04/17  Seek sooner medical attention if there is any worsening of symptoms or problems.

## 2017-11-30 NOTE — PROGRESS NOTES
Rob Evans,    This is to inform you regarding your test result.    Mammogram result is satisfactory .  Recommendation: annual mammography      Sincerely,      Dr.Nasima Nikolay MD,FACP

## 2017-11-30 NOTE — PROGRESS NOTES
SUBJECTIVE:   CC: Cristina Milton is an 62 year old woman who presents for preventive health visit.     Healthy Habits:    Do you get at least three servings of calcium containing foods daily (dairy, green leafy vegetables, etc.)? yes    Amount of exercise or daily activities, outside of work: 0 day(s) per week    Problems taking medications regularly forgetting occassionally     Medication side effects: Yes vocals over time with inhalers and asthma    Have you had an eye exam in the past two years? yes    Do you see a dentist twice per year? 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    Uses CPAP daily and reports it helps her sleep      Today's PHQ-2 Score:   PHQ-2 ( 1999 Pf[employer]) 8/16/2017 10/31/2016   Q1: Little interest or pleasure in doing things 0 0   Q2: Feeling down, depressed or hopeless 0 0   PHQ-2 Score 0 0         Abuse: Current or Past(Physical, Sexual or Emotional)- NO  Do you feel safe in your environment - YES        Social History   Substance Use Topics     Smoking status: Never Smoker     Smokeless tobacco: Never Used     Alcohol use 0.0 oz/week     0 Standard drinks or equivalent per week      Comment: rare     The patient does not drink >3 drinks per day nor >7 drinks per week.    Reviewed orders with patient.  Reviewed health maintenance and updated orders accordingly - Yes  Labs reviewed in EPIC  Patient Active Problem List   Diagnosis     Arthritis of knee, right     Advanced directives, counseling/discussion     Asthma     Right shoulder pain     Depression     Obstructive sleep apnea     Tubular adenoma     BMI> 39     Past Surgical  History:   Procedure Laterality Date     ARTHROPLASTY KNEE Right 2/17/2015    Procedure: ARTHROPLASTY KNEE;  Surgeon: Jermain Gonzalez MD;  Location:  OR     BREAST SURGERY  1995    lumpectomy,early cancer     COLONOSCOPY  2006,2008     COLONOSCOPY N/A 12/15/2016    Procedure: COMBINED COLONOSCOPY, SINGLE OR MULTIPLE BIOPSY/POLYPECTOMY BY BIOPSY;  Surgeon: Gil Sanchez MD;  Location:  GI     ENT SURGERY  2012    dilation esoph stricture     GYN SURGERY  1999    hysterectomy with ooperectomy     GYN SURGERY  1995    conization cervix knife/laser     HYSTERECTOMY TOTAL ABDOMINAL       LAPAROSCOPIC CHOLECYSTECTOMY  11/19/2012    Procedure: LAPAROSCOPIC CHOLECYSTECTOMY;  LAPAROSCOPIC CHOLECYSTECTOMY;  Surgeon: Jama Persaud MD;  Location:  [facility]     TONSILLECTOMY         Social History   Substance Use Topics     Smoking status: Never Smoker     Smokeless tobacco: Never Used     Alcohol use 0.0 oz/week     0 Standard drinks or equivalent per week      Comment: rare     Family History   Problem Relation Age of Onset     Prostate Cancer Father      Lymphoma Sister      Family History Negative Mother      Colon Cancer Maternal Grandfather      Chronic Obstructive Pulmonary Disease Brother      Lymphoma Maternal Grandmother      Breast Cancer No family hx of          Current Outpatient Prescriptions   Medication Sig Dispense Refill     citalopram (CELEXA) 40 MG tablet Take 1 tablet (40 mg) by mouth daily 90 tablet 3     albuterol (2.5 MG/3ML) 0.083% neb solution Take 1 vial (2.5 mg) by nebulization every 6 hours as needed for shortness of breath / dyspnea or wheezing 25 vial 0     fluticasone-salmeterol (ADVAIR) 250-50 MCG/DOSE diskus inhaler Inhale 1 puff into the lungs 2 times daily 3 Inhaler 3     predniSONE (DELTASONE) 10 MG tablet 4 tabs daily for 3 days, then 3 tabs daily for 3 days, then 2 tabs daily for 3 days, then 1 tab daily for 3 days, then stop 30 tablet 0     ranitidine (ZANTAC) 150 MG tablet  Take 1 tablet (150 mg) by mouth 2 times daily 60 tablet 1     methylPREDNISolone sodium succinate (SOLU-MEDROL) 40 MG injection Inject 1 mL (40 mg) into the muscle once for 1 dose 1 mL 0     ipratropium - albuterol 0.5 mg/2.5 mg/3 mL (DUONEB) 0.5-2.5 (3) MG/3ML neb solution Take 1 vial (3 mLs) by nebulization once for 1 dose 3 mL 0     amoxicillin-clavulanate (AUGMENTIN) 500-125 MG per tablet Take 1 tablet by mouth 2 times daily for 7 days 14 tablet 0     albuterol (VENTOLIN HFA) 108 (90 BASE) MCG/ACT Inhaler Inhale 2 puffs into the lungs every 4 hours as needed for shortness of breath / dyspnea or wheezing 18 g 1     albuterol (2.5 MG/3ML) 0.083% neb solution Take 1 vial (2.5 mg) by nebulization every 6 hours as needed for shortness of breath / dyspnea or wheezing 1 Box 0     fluticasone (FLONASE) 50 MCG/ACT spray SHAKE LIQUID AND USE 2 SPRAYS IN EACH NOSTRIL DAILY 48 mL 1     montelukast (SINGULAIR) 10 MG tablet Take 1 tablet (10 mg) by mouth At Bedtime 90 tablet 3     Biotin 1 MG CAPS Take by mouth daily        nitroglycerin (NITROSTAT) 0.4 MG SL tablet Place 1 tablet (0.4 mg) under the tongue every 5 minutes as needed for chest pain call 911 if not gone 25 tablet 0     cholecalciferol (VITAMIN D-3 SUPER STRENGTH) 2000 UNITS tablet Take 2,000 Units by mouth       acetaminophen (TYLENOL) 325 MG tablet Take 2 tablets (650 mg) by mouth every 6 hours 100 tablet 0     calcium-vitamin D (CALTRATE) 600-400 MG-UNIT per tablet Take 2 tablets by mouth daily       MULTIPLE VITAMIN PO Take 1 tablet by mouth daily       glucosamine-chondroitin 500-400 MG CAPS Take 2 capsules by mouth daily        [DISCONTINUED] albuterol (PROAIR HFA/PROVENTIL HFA/VENTOLIN HFA) 108 (90 BASE) MCG/ACT Inhaler Inhale 1-2 puffs into the lungs every 4 hours as needed for shortness of breath / dyspnea or wheezing 1 Inhaler 0     [DISCONTINUED] albuterol (2.5 MG/3ML) 0.083% neb solution Take 1 vial (2.5 mg) by nebulization every 6 hours as needed for  "shortness of breath / dyspnea or wheezing 25 vial 0     [DISCONTINUED] citalopram (CELEXA) 40 MG tablet Take 1 tablet (40 mg) by mouth daily 90 tablet 3     Allergies   Allergen Reactions     Cats      Dogs      Dust Mites      Mold      Nsaids      Gastric ulcer     Trees          Patient over age 50, mutual decision to screen reflected in health maintenance.      Pertinent mammograms are reviewed under the imaging tab.  History of abnormal Pap smear: NO - age 30- 65 PAP every 3 years recommended    Reviewed and updated as needed this visit by clinical staffTobacco  Allergies  Meds         Reviewed and updated as needed this visit by Provider              ROS:  C: NEGATIVE for fever, chills, change in weight  I: NEGATIVE for worrisome rashes, moles or lesions  E: NEGATIVE for vision changes or irritation  ENT: NEGATIVE for ear, mouth and throat problems  R: POSITIVE for cough with sputum, some SOB  B: NEGATIVE for masses, tenderness or discharge  CV: NEGATIVE for chest pain, palpitations or peripheral edema  GI: NEGATIVE for nausea, abdominal pain, heartburn, or change in bowel habits  : NEGATIVE for unusual urinary or vaginal symptoms. No vaginal bleeding.  M: NEGATIVE for significant arthralgias or myalgia  N: NEGATIVE for weakness, dizziness or paresthesias  P: NEGATIVE for changes in mood or affect     This document serves as a record of the services and decisions personally performed and made by Mela Link MD. It was created on her behalf by Kristi Burnett, a trained medical scribe. The creation of this document is based on the provider's statements to the medical scribe.  Kristi Burnett 3:56 PM November 30, 2017    OBJECTIVE:   /78 (BP Location: Right arm, Patient Position: Sitting, Cuff Size: Adult Large)  Pulse 77  Temp 98.3  F (36.8  C) (Tympanic)  Ht 1.727 m (5' 8\")  Wt 117 kg (258 lb)  SpO2 95%  Breastfeeding? No  BMI 39.23 kg/m2    EXAM:  GENERAL APPEARANCE: healthy, alert and " no distress  EYES: Eyes grossly normal to inspection, PERRL and conjunctivae and sclerae normal  HENT: ear canals and TM's normal, nose and mouth without ulcers or lesions, oropharynx clear and oral mucous membranes moist  NECK: no adenopathy, no asymmetry, masses, or scars and thyroid normal to palpation  RESP: extensive wheezing heard bilaterally  BREAST: left breast smaller due to lumpectomy, right breast normal without masses, tenderness or nipple discharge and no palpable axillary masses or adenopathy  CV: regular rate and rhythm, normal S1 S2, no S3 or S4, no murmur, click or rub, mild LL edema, peripheral pulses strong  ABDOMEN: soft, nontender, no hepatosplenomegaly, no masses and bowel sounds normal  MS: hammer toe on left side, gait is age appropriate without ataxia  SKIN: no suspicious lesions, rash on neck  NEURO: Normal strength and tone, sensory exam grossly normal, mentation intact and speech normal  PSYCH: mentation appears normal and affect normal/bright    ASSESSMENT/PLAN:   Cristina was seen today for physical.    Diagnoses and all orders for this visit:    Routine history and physical examination of adult  -     CBC with platelets and differential  -     TSH with free T4 reflex  Mammogram done today, and came back normal  Up to date with immunizations    Need for prophylactic vaccination and inoculation against influenza  -     FLU VAC, SPLIT VIRUS IM > 3 YO (QUADRIVALENT) [26480]  -     Vaccine Administration, Initial [86576]  Flu shot today    Morbid obesity (H)  Discussed healthy eating and exercise habits  Patient is trying to lose weight    Tubular adenoma  Comments:  seen on colonoscopy on 12/15/16  Colonoscopy done in 2016, found pre-cancerous polyp, recommended colonoscopy every 5 years    Osteopenia, unspecified location  Advised patient to take calcium from diet or supplementation as well as Vitamin D3 supplements daily    Recurrent major depressive disorder, in full remission (H)  -      citalopram (CELEXA) 40 MG tablet; Take 1 tablet (40 mg) by mouth daily  Has been under a lot of stress at work  Compliant with medication  Doing well overall    Moderate asthma with acute exacerbation, unspecified whether persistent  -     albuterol (2.5 MG/3ML) 0.083% neb solution; Take 1 vial (2.5 mg) by nebulization every 6 hours as needed for shortness of breath / dyspnea or wheezing  -     fluticasone-salmeterol (ADVAIR) 250-50 MCG/DOSE diskus inhaler; Inhale 1 puff into the lungs 2 times daily  -     predniSONE (DELTASONE) 10 MG tablet; 4 tabs daily for 3 days, then 3 tabs daily for 3 days, then 2 tabs daily for 3 days, then 1 tab daily for 3 days, then stop  -     methylPREDNISolone sodium succinate (SOLU-MEDROL) 40 MG injection; Inject 1 mL (40 mg) into the muscle once for 1 dose  -     ipratropium - albuterol 0.5 mg/2.5 mg/3 mL (DUONEB) 0.5-2.5 (3) MG/3ML neb solution; Take 1 vial (3 mLs) by nebulization once for 1 dose  -     amoxicillin-clavulanate (AUGMENTIN) 500-125 MG per tablet; Take 1 tablet by mouth 2 times daily for 7 days  Patient is complaining of respiratory symptoms including cough with sputum and SOB  Sputum is clear, yellow, or green sometimes  Using Singulair almost every night  Not using Qvar consistently as it's not effective  Additionally, 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, 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 Advair instead  If Advair doesn't go through with her insurance, she will use Qvar instead for now  We discussed other options if insurance rejects Advair  Reports albuterol 3 times a day  CXR on 08/16/17 showed chronic fibrotic changes, some fluid, and she was put on a course of antibiotics  Has taken Zithromax before, but it didn't alleviate symptoms  Due to symptoms I prescribed patient a tapering dose of prednisone and a course of antibitoics  If worsening of symptoms in the next two weeks, I will consider re-doing CXR  Explained that prednisone is ok to use for short term    SOB  "(shortness of breath)  -     BNP-N terminal pro  Upon examination, I noticed extensive wheezing on both sides  Salmuderol injection and nebulizer given in office visit  Patient felt better after DuoNeb nebulizer    Dyspepsia  -     ranitidine (ZANTAC) 150 MG tablet; Take 1 tablet (150 mg) by mouth 2 times daily  Explained that asthma patients may also have acid reflux , so I prescribed ranitidine To prevent acid from  irritating her throat  Explained the importance of taking ranitidine with prednisone, especially since patient is also on citalopram for depression  As both medication can cause acidity    Obstructive sleep apnea  Patient reports using CPAP daily, with good relief of symptoms and good sleep    Medication management  -     Comprehensive metabolic panel    Other orders  -     Cancel: beclomethasone (QVAR) 80 MCG/ACT Inhaler; Inhale 2 puffs into the lungs 2 times daily    COUNSELING:   Reviewed preventive health counseling, as reflected in patient instructions       Regular exercise       Healthy diet/nutrition       Immunizations    Vaccinated for: Influenza, Pneumococcal and TDAP         Colon cancer screening     reports that she has never smoked. She has never used smokeless tobacco.    Estimated body mass index is 39.23 kg/(m^2) as calculated from the following:    Height as of this encounter: 1.727 m (5' 8\").    Weight as of this encounter: 117 kg (258 lb).   Weight management plan: Discussed healthy diet and exercise guidelines and patient will follow up in 12 months in clinic to re-evaluate.    Patient Instructions   Solumedrol  shot today  Labs today  Take prednisone in a tapering dose 4 tablets for 3 days, then 3 tablets for 3 days, then 2 tablets for 3 days, then 1 tablet daily for 3 days, then stop  Take prednisone in the morning as it may affect your sleep  Take prednisone with food  Use Advair twice daily  If it doesn't go through with insurance, use Qvar for now  Rinse mouth after using " inhaler  Take Zantac twice a day while you're on prednisone and having asthma flare-ups  Come back to see me next Monday 12/04/17  Seek sooner medical attention if there is any worsening of symptoms or problems.    Counseling Resources:  ATP IV Guidelines  Pooled Cohorts Equation Calculator  Breast Cancer Risk Calculator  FRAX Risk Assessment  ICSI Preventive Guidelines  Dietary Guidelines for Americans, 2010  [employer]'s MyPlate  ASA Prophylaxis  Lung CA Screening    The information in this document, created by the medical scribe for me, accurately reflects the services I personally performed and the decisions made by me. I have reviewed and approved this document for accuracy prior to leaving the patient care area.  November 30, 2017 4:25 PM    Mela Link MD  [facility]

## 2017-11-30 NOTE — NURSING NOTE
The following nebulizer treatment was given:     MEDICATION: Duoneb  : [employer]  LOT #: [id number]  EXPIRATION DATE:  05/01/2019  NDC # 8148-8235-57  Peggy Kincaid CMA     .

## 2017-11-30 NOTE — PROGRESS NOTES
Injectable Influenza Immunization Documentation    1.  Is the person to be vaccinated sick today?   No    2. Does the person to be vaccinated have an allergy to a component   of the vaccine?   No  Egg Allergy Algorithm Link    3. Has the person to be vaccinated ever had a serious reaction   to influenza vaccine in the past?   No    4. Has the person to be vaccinated ever had Guillain-Barré syndrome?   No    Form completed by Peggy Kincaid CMA  Prior to injection verified patient identity using patient's name and date of birth.

## 2017-11-30 NOTE — NURSING NOTE
"Chief Complaint   Patient presents with     Physical       Initial /78 (BP Location: Right arm, Patient Position: Sitting, Cuff Size: Adult Large)  Pulse 77  Temp 98.3  F (36.8  C) (Tympanic)  Ht 5' 8\" (1.727 m)  Wt 258 lb (117 kg)  SpO2 95%  Breastfeeding? No  BMI 39.23 kg/m2 Estimated body mass index is 39.23 kg/(m^2) as calculated from the following:    Height as of this encounter: 5' 8\" (1.727 m).    Weight as of this encounter: 258 lb (117 kg).  Medication Reconciliation: complete   Peggy Granville- CMA      "

## 2017-11-30 NOTE — MR AVS SNAPSHOT
After Visit Summary   11/30/2017    Cristina Milton    MRN: [id number]           Patient Information     Date Of Birth          1955        Visit Information        Provider Department      11/30/2017 3:30 PM Mela Link MD [facility]        Today's Diagnoses     Routine history and physical examination of adult    -  1    Need for prophylactic vaccination and inoculation against influenza        Morbid obesity (H)        Tubular adenoma        Osteopenia, unspecified location        Recurrent major depressive disorder, in full remission (H)        Moderate asthma with acute exacerbation, unspecified whether persistent        Dyspepsia        Obstructive sleep apnea        Medication management        SOB (shortness of breath)          Care Instructions      Preventive Health Recommendations  Female Ages 50 - 64    Yearly exam: See your health care provider every year in order to  o Review health changes.   o Discuss preventive care.    o Review your medicines if your doctor has prescribed any.      Get a Pap test every three years (unless you have an abnormal result and your provider advises testing more often).    If you get Pap tests with HPV test, you only need to test every 5 years, unless you have an abnormal result.     You do not need a Pap test if your uterus was removed (hysterectomy) and you have not had cancer.    You should be tested each year for STDs (sexually transmitted diseases) if you're at risk.     Have a mammogram every 1 to 2 years.    Have a colonoscopy at age 50, or have a yearly FIT test (stool test). These exams screen for colon cancer.      Have a cholesterol test every 5 years, or more often if advised.    Have a diabetes test (fasting glucose) every three years. If you are at risk for diabetes, you should have this test more often.     If you are at risk for osteoporosis (brittle bone disease), think about having a bone density scan  (DEXA).    Shots: Get a flu shot each year. Get a tetanus shot every 10 years.    Nutrition:     Eat at least 5 servings of fruits and vegetables each day.    Eat whole-grain bread, whole-wheat pasta and brown rice instead of white grains and rice.    Talk to your provider about Calcium and Vitamin D.     Lifestyle    Exercise at least 150 minutes a week (30 minutes a day, 5 days a week). This will help you control your weight and prevent disease.    Limit alcohol to one drink per day.    No smoking.     Wear sunscreen to prevent skin cancer.     See your dentist every six months for an exam and cleaning.    See your eye doctor every 1 to 2 years.    Solu medrol  shot today  Labs today  Take prednisone in a tapering dose 4 tablets for 3 days, then 3 tablets for 3 days, then 2 tablets for 3 days, then 1 tablet daily for 3 days, then stop  Take prednisone in the morning as it may affect your sleep  Take prednisone with food  Use Advair daily  If it doesn't go through with insurance, use Qvar for not  Rinse mouth after using inhaler  Take Zantac twice a day while you're on prednisone and having asthma flare-ups  Come back to see me next Monday 12/04/17  Seek sooner medical attention if there is any worsening of symptoms or problems.          Follow-ups after your visit        Who to contact     If you have questions or need follow up information about today's clinic visit or your schedule please contact [facility] directly at [phone].  Normal or non-critical lab and imaging results will be communicated to you by MyChart, letter or phone within 4 business days after the clinic has received the results. If you do not hear from us within 7 days, please contact the clinic through MyChart or phone. If you have a critical or abnormal lab result, we will notify you by phone as soon as possible.  Submit refill requests through incrediblue or call your pharmacy and they will forward the refill request to us. Please  "allow 3 business days for your refill to be completed.          Additional Information About Your Visit        [employer] Information     [employer] gives you secure access to your electronic health record. If you see a primary care provider, you can also send messages to your care team and make appointments. If you have questions, please call your primary care clinic.  If you do not have a primary care provider, please call [phone] and they will assist you.        Care EveryWhere ID     This is your Care EveryWhere ID. This could be used by other organizations to access your [place] medical records  [id number]        Your Vitals Were     Pulse Temperature Height Pulse Oximetry Breastfeeding? BMI (Body Mass Index)    77 98.3  F (36.8  C) (Tympanic) 5' 8\" (1.727 m) 95% No 39.23 kg/m2       Blood Pressure from Last 3 Encounters:   11/30/17 121/78   10/01/17 134/78   08/16/17 120/74    Weight from Last 3 Encounters:   11/30/17 258 lb (117 kg)   10/01/17 261 lb 8 oz (118.6 kg)   08/16/17 259 lb 12.8 oz (117.8 kg)              We Performed the Following     BNP-N terminal pro     CBC with platelets and differential     Comprehensive metabolic panel     FLU VAC, SPLIT VIRUS IM > 3 YO (QUADRIVALENT) [93302]     TSH with free T4 reflex     Vaccine Administration, Initial [80825]          Today's Medication Changes          These changes are accurate as of: 11/30/17  4:22 PM.  If you have any questions, ask your nurse or doctor.               Start taking these medicines.        Dose/Directions    amoxicillin-clavulanate 500-125 MG per tablet   Commonly known as:  AUGMENTIN   Used for:  Moderate asthma with acute exacerbation, unspecified whether persistent   Started by:  Mela Link MD        Dose:  1 tablet   Take 1 tablet by mouth 2 times daily for 7 days   Quantity:  14 tablet   Refills:  0       fluticasone-salmeterol 250-50 MCG/DOSE diskus inhaler   Commonly known as:  ADVAIR   Used for:  Moderate asthma with " acute exacerbation, unspecified whether persistent   Started by:  Mela Link MD        Dose:  1 puff   Inhale 1 puff into the lungs 2 times daily   Quantity:  3 Inhaler   Refills:  3       ipratropium - albuterol 0.5 mg/2.5 mg/3 mL 0.5-2.5 (3) MG/3ML neb solution   Commonly known as:  DUONEB   Used for:  Moderate asthma with acute exacerbation, unspecified whether persistent   Started by:  Mela Link MD        Dose:  1 vial   Take 1 vial (3 mLs) by nebulization once for 1 dose   Quantity:  3 mL   Refills:  0       methylPREDNISolone sodium succinate 40 MG injection   Commonly known as:  SOLU-MEDROL   Used for:  Moderate asthma with acute exacerbation, unspecified whether persistent   Started by:  Mela Link MD        Dose:  40 mg   Inject 1 mL (40 mg) into the muscle once for 1 dose   Quantity:  1 mL   Refills:  0       predniSONE 10 MG tablet   Commonly known as:  DELTASONE   Used for:  Moderate asthma with acute exacerbation, unspecified whether persistent   Started by:  Mela Link MD        4 tabs daily for 3 days, then 3 tabs daily for 3 days, then 2 tabs daily for 3 days, then 1 tab daily for 3 days, then stop   Quantity:  30 tablet   Refills:  0       ranitidine 150 MG tablet   Commonly known as:  ZANTAC   Used for:  Dyspepsia   Started by:  Mela Link MD        Dose:  150 mg   Take 1 tablet (150 mg) by mouth 2 times daily   Quantity:  60 tablet   Refills:  1         Stop taking these medicines if you haven't already. Please contact your care team if you have questions.     beclomethasone 80 MCG/ACT Inhaler   Commonly known as:  QVAR   Stopped by:  Mela Link MD                Where to get your medicines      [id number]  [place] - [address] & [facility]  [address] [place] [address]    Hours:  24-hours Phone:  [phone]     albuterol (2.5 MG/3ML) 0.083% neb solution     amoxicillin-clavulanate 500-125 MG per tablet    citalopram 40 MG tablet    fluticasone-salmeterol 250-50 MCG/DOSE diskus inhaler    predniSONE 10 MG tablet    ranitidine 150 MG tablet         Some of these will need a paper prescription and others can be bought over the counter.  Ask your nurse if you have questions.     You don't need a prescription for these medications     ipratropium - albuterol 0.5 mg/2.5 mg/3 mL 0.5-2.5 (3) MG/3ML neb solution    methylPREDNISolone sodium succinate 40 MG injection                Primary Care Provider Office Phone # Fax #    Mela ANGELA Link [phone] [address] [address]        Equal Access to Services     ESTRELLA CONTRERAS : Fredy Phelan, waaxda manju, qaybta kaalmada paula, joseluis bond. So [facility] [phone].    ATENCIÓN: Si habla español, tiene a araiza disposición servicios gratuitos de asistencia lingüística. [facility] [phone].    We comply with applicable federal civil rights laws and [place] laws. We do not discriminate on the basis of race, color, national origin, age, disability, sex, sexual orientation, or gender identity.            Thank you!     Thank you for choosing [facility]  for your care. Our goal is always to provide you with excellent care. Hearing back from our patients is one way we can continue to improve our services. Please take a few minutes to complete the written survey that you may receive in the mail after your visit with us. Thank you!             Your Updated Medication List - Protect others around you: Learn how to safely use, store and throw away your medicines at www.disposemymeds.org.          This list is accurate as of: 11/30/17  4:22 PM.  Always use your most recent med list.                   Brand Name Dispense Instructions for use Diagnosis    acetaminophen 325 MG tablet    TYLENOL    100 tablet    Take 2 tablets  (650 mg) by mouth every 6 hours    Arthritis of knee, right       * albuterol (2.5 MG/3ML) 0.083% neb solution     1 Box    Take 1 vial (2.5 mg) by nebulization every 6 hours as needed for shortness of breath / dyspnea or wheezing    Moderate persistent asthma with (acute) exacerbation       * albuterol 108 (90 BASE) MCG/ACT Inhaler    VENTOLIN HFA    18 g    Inhale 2 puffs into the lungs every 4 hours as needed for shortness of breath / dyspnea or wheezing    Mild intermittent asthma without complication       * albuterol (2.5 MG/3ML) 0.083% neb solution     25 vial    Take 1 vial (2.5 mg) by nebulization every 6 hours as needed for shortness of breath / dyspnea or wheezing    Moderate asthma with acute exacerbation, unspecified whether persistent       amoxicillin-clavulanate 500-125 MG per tablet    AUGMENTIN    14 tablet    Take 1 tablet by mouth 2 times daily for 7 days    Moderate asthma with acute exacerbation, unspecified whether persistent       Biotin 1 MG Caps      Take by mouth daily        calcium-vitamin D 600-400 MG-UNIT per tablet    CALTRATE     Take 2 tablets by mouth daily        citalopram 40 MG tablet    celeXA    90 tablet    Take 1 tablet (40 mg) by mouth daily    Recurrent major depressive disorder, in full remission (H)       fluticasone 50 MCG/ACT spray    FLONASE    48 mL    SHAKE LIQUID AND USE 2 SPRAYS IN EACH NOSTRIL DAILY    Seasonal allergic rhinitis       fluticasone-salmeterol 250-50 MCG/DOSE diskus inhaler    ADVAIR    3 Inhaler    Inhale 1 puff into the lungs 2 times daily    Moderate asthma with acute exacerbation, unspecified whether persistent       glucosamine-chondroitin 500-400 MG Caps per capsule      Take 2 capsules by mouth daily        ipratropium - albuterol 0.5 mg/2.5 mg/3 mL 0.5-2.5 (3) MG/3ML neb solution    DUONEB    3 mL    Take 1 vial (3 mLs) by nebulization once for 1 dose    Moderate asthma with acute exacerbation, unspecified whether persistent        methylPREDNISolone sodium succinate 40 MG injection    SOLU-MEDROL    1 mL    Inject 1 mL (40 mg) into the muscle once for 1 dose    Moderate asthma with acute exacerbation, unspecified whether persistent       montelukast 10 MG tablet    SINGULAIR    90 tablet    Take 1 tablet (10 mg) by mouth At Bedtime    Mild intermittent asthma without complication       MULTIPLE VITAMIN PO      Take 1 tablet by mouth daily        nitroGLYcerin 0.4 MG sublingual tablet    NITROSTAT    25 tablet    Place 1 tablet (0.4 mg) under the tongue every 5 minutes as needed for chest pain call 911 if not gone    Precordial pain       predniSONE 10 MG tablet    DELTASONE    30 tablet    4 tabs daily for 3 days, then 3 tabs daily for 3 days, then 2 tabs daily for 3 days, then 1 tab daily for 3 days, then stop    Moderate asthma with acute exacerbation, unspecified whether persistent       ranitidine 150 MG tablet    ZANTAC    60 tablet    Take 1 tablet (150 mg) by mouth 2 times daily    Dyspepsia       Vitamin D-3 Super Strength 2000 UNITS tablet   Generic drug:  cholecalciferol      Take 2,000 Units by mouth        * Notice:  This list has 3 medication(s) that are the same as other medications prescribed for you. Read the directions carefully, and ask your doctor or other care provider to review them with you.

## 2017-12-01 LAB
ALBUMIN SERPL-MCNC: 3.7 G/DL (ref 3.4–5)
ALP SERPL-CCNC: 84 U/L (ref 40–150)
ALT SERPL W P-5'-P-CCNC: 45 U/L (ref 0–50)
ANION GAP SERPL CALCULATED.3IONS-SCNC: 8 MMOL/L (ref 3–14)
AST SERPL W P-5'-P-CCNC: 16 U/L (ref 0–45)
BILIRUB SERPL-MCNC: 0.4 MG/DL (ref 0.2–1.3)
BUN SERPL-MCNC: 17 MG/DL (ref 7–30)
CALCIUM SERPL-MCNC: 9.3 MG/DL (ref 8.5–10.1)
CHLORIDE SERPL-SCNC: 106 MMOL/L (ref 94–109)
CO2 SERPL-SCNC: 27 MMOL/L (ref 20–32)
CREAT SERPL-MCNC: 0.82 MG/DL (ref 0.52–1.04)
GFR SERPL CREATININE-BSD FRML MDRD: 70 ML/MIN/1.7M2
GLUCOSE SERPL-MCNC: 91 MG/DL (ref 70–99)
POTASSIUM SERPL-SCNC: 3.9 MMOL/L (ref 3.4–5.3)
PROT SERPL-MCNC: 7.4 G/DL (ref 6.8–8.8)
SODIUM SERPL-SCNC: 141 MMOL/L (ref 133–144)
TSH SERPL DL<=0.005 MIU/L-ACNC: 0.7 MU/L (ref 0.4–4)

## 2017-12-01 ASSESSMENT — ASTHMA QUESTIONNAIRES: ACT_TOTALSCORE: 9

## 2017-12-01 NOTE — PROGRESS NOTES
Rob Evans,    This is to inform you regarding your test result.    BNP test To check congestive heart failure is normal  The testing of your blood sugar, kidney function, liver function and electrolytes was normal.  .TSH which is thyroid hormone is normal.  White count and hemoglobin is normal  Eosinophils are slightly elevated due to allergies    Sincerely,      Dr.Nasima Nikolay MD,FACP

## 2017-12-04 ENCOUNTER — OFFICE VISIT (OUTPATIENT)
Dept: FAMILY MEDICINE | Facility: CLINIC | Age: 62
End: 2017-12-04
Payer: COMMERCIAL

## 2017-12-04 VITALS
HEIGHT: 68 IN | DIASTOLIC BLOOD PRESSURE: 72 MMHG | OXYGEN SATURATION: 95 % | SYSTOLIC BLOOD PRESSURE: 128 MMHG | TEMPERATURE: 97.1 F | BODY MASS INDEX: 39.25 KG/M2 | HEART RATE: 78 BPM | WEIGHT: 259 LBS

## 2017-12-04 DIAGNOSIS — J45.901 MODERATE ASTHMA WITH ACUTE EXACERBATION, UNSPECIFIED WHETHER PERSISTENT: Primary | ICD-10-CM

## 2017-12-04 PROCEDURE — 99213 OFFICE O/P EST LOW 20 MIN: CPT | Performed by: INTERNAL MEDICINE

## 2017-12-04 NOTE — MR AVS SNAPSHOT
After Visit Summary   12/4/2017    Cristina Milton    MRN: [id number]           Patient Information     Date Of Birth          1955        Visit Information        Provider Department      12/4/2017 4:30 PM Mela Link MD [facility]        Care Instructions    You can buy Zantac over the counter  This will protect your stomach while you are taking Prednisone  Continue treatment as prescribed  Follow up in 6 months  Seek sooner medical attention if there is any worsening of symptoms or problems.            Follow-ups after your visit        Who to contact     If you have questions or need follow up information about today's clinic visit or your schedule please contact [facility] directly at [phone].  Normal or non-critical lab and imaging results will be communicated to you by MyChart, letter or phone within 4 business days after the clinic has received the results. If you do not hear from us within 7 days, please contact the clinic through [employer] or phone. If you have a critical or abnormal lab result, we will notify you by phone as soon as possible.  Submit refill requests through depict or call your pharmacy and they will forward the refill request to us. Please allow 3 business days for your refill to be completed.          Additional Information About Your Visit        MyChart Information     depict gives you secure access to your electronic health record. If you see a primary care provider, you can also send messages to your care team and make appointments. If you have questions, please call your primary care clinic.  If you do not have a primary care provider, please call [phone] and they will assist you.        Care EveryWhere ID     This is your Care EveryWhere ID. This could be used by other organizations to access your [place] medical records  [id number]        Your Vitals Were     Pulse Temperature Height Pulse Oximetry Breastfeeding? BMI  "(Body Mass Index)    78 97.1  F (36.2  C) (Oral) 5' 8\" (1.727 m) 95% No 39.38 kg/m2       Blood Pressure from Last 3 Encounters:   12/04/17 128/72   11/30/17 121/78   10/01/17 134/78    Weight from Last 3 Encounters:   12/04/17 259 lb (117.5 kg)   11/30/17 258 lb (117 kg)   10/01/17 261 lb 8 oz (118.6 kg)              Today, you had the following     No orders found for display         Today's Medication Changes          These changes are accurate as of: 12/4/17  4:38 PM.  If you have any questions, ask your nurse or doctor.               These medicines have changed or have updated prescriptions.        Dose/Directions    albuterol 108 (90 BASE) MCG/ACT Inhaler   Commonly known as:  VENTOLIN HFA   This may have changed:  Another medication with the same name was removed. Continue taking this medication, and follow the directions you see here.   Used for:  Mild intermittent asthma without complication   Changed by:  Mela Link MD        Dose:  2 puff   Inhale 2 puffs into the lungs every 4 hours as needed for shortness of breath / dyspnea or wheezing   Quantity:  18 g   Refills:  1         Stop taking these medicines if you haven't already. Please contact your care team if you have questions.     ipratropium - albuterol 0.5 mg/2.5 mg/3 mL 0.5-2.5 (3) MG/3ML neb solution   Commonly known as:  DUONEB   Stopped by:  Mela Link MD                    Primary Care Provider Office Phone # Fax #    Mela Link [phone] [address] [facility] [address]  [place]                [place] [address]        Equal Access to Services     [facility] DIANE AH: Hadtony Phelna, sophie nunes, ottoniel mitchell, joseluis bond. So [facility] [phone].    ATENCIÓN: Si habla español, tiene a araiza disposición servicios gratuitos de asistencia lingüística. Llame al [phone].    We comply with applicable federal civil rights laws and [place] laws. We do not discriminate " on the basis of race, color, national origin, age, disability, sex, sexual orientation, or gender identity.            Thank you!     Thank you for choosing [facility]  for your care. Our goal is always to provide you with excellent care. Hearing back from our patients is one way we can continue to improve our services. Please take a few minutes to complete the written survey that you may receive in the mail after your visit with us. Thank you!             Your Updated Medication List - Protect others around you: Learn how to safely use, store and throw away your medicines at www.disposemymeds.org.          This list is accurate as of: 12/4/17  4:38 PM.  Always use your most recent med list.                   Brand Name Dispense Instructions for use Diagnosis    acetaminophen 325 MG tablet    TYLENOL    100 tablet    Take 2 tablets (650 mg) by mouth every 6 hours    Arthritis of knee, right       albuterol 108 (90 BASE) MCG/ACT Inhaler    VENTOLIN HFA    18 g    Inhale 2 puffs into the lungs every 4 hours as needed for shortness of breath / dyspnea or wheezing    Mild intermittent asthma without complication       amoxicillin-clavulanate 500-125 MG per tablet    AUGMENTIN    14 tablet    Take 1 tablet by mouth 2 times daily for 7 days    Moderate asthma with acute exacerbation, unspecified whether persistent       Biotin 1 MG Caps      Take by mouth daily        calcium-vitamin D 600-400 MG-UNIT per tablet    CALTRATE     Take 2 tablets by mouth daily        citalopram 40 MG tablet    celeXA    90 tablet    Take 1 tablet (40 mg) by mouth daily    Recurrent major depressive disorder, in full remission (H)       fluticasone 50 MCG/ACT spray    FLONASE    48 mL    SHAKE LIQUID AND USE 2 SPRAYS IN EACH NOSTRIL DAILY    Seasonal allergic rhinitis       fluticasone-salmeterol 250-50 MCG/DOSE diskus inhaler    ADVAIR    3 Inhaler    Inhale 1 puff into the lungs 2 times daily    Moderate asthma with acute  exacerbation, unspecified whether persistent       glucosamine-chondroitin 500-400 MG Caps per capsule      Take 2 capsules by mouth daily        montelukast 10 MG tablet    SINGULAIR    90 tablet    Take 1 tablet (10 mg) by mouth At Bedtime    Mild intermittent asthma without complication       MULTIPLE VITAMIN PO      Take 1 tablet by mouth daily        nitroGLYcerin 0.4 MG sublingual tablet    NITROSTAT    25 tablet    Place 1 tablet (0.4 mg) under the tongue every 5 minutes as needed for chest pain call 911 if not gone    Precordial pain       predniSONE 10 MG tablet    DELTASONE    30 tablet    4 tabs daily for 3 days, then 3 tabs daily for 3 days, then 2 tabs daily for 3 days, then 1 tab daily for 3 days, then stop    Moderate asthma with acute exacerbation, unspecified whether persistent       ranitidine 150 MG tablet    ZANTAC    60 tablet    Take 1 tablet (150 mg) by mouth 2 times daily    Dyspepsia       Vitamin D-3 Super Strength 2000 UNITS tablet   Generic drug:  cholecalciferol      Take 2,000 Units by mouth

## 2017-12-04 NOTE — PATIENT INSTRUCTIONS
You can buy Zantac over the counter  This will protect your stomach while you are taking Prednisone  Continue treatment as prescribed  Follow up in 6 months  Seek sooner medical attention if there is any worsening of symptoms or problems.

## 2017-12-04 NOTE — NURSING NOTE
"Chief Complaint   Patient presents with     RECHECK     Asthma exacerbation, placed on prednisone       Initial /72  Pulse 78  Temp 97.1  F (36.2  C) (Oral)  Ht 5' 8\" (1.727 m)  Wt 259 lb (117.5 kg)  SpO2 95%  Breastfeeding? No  BMI 39.38 kg/m2 Estimated body mass index is 39.38 kg/(m^2) as calculated from the following:    Height as of this encounter: 5' 8\" (1.727 m).    Weight as of this encounter: 259 lb (117.5 kg).  Medication Reconciliation: complete     Lawrence Gamino, LISANDRO     "

## 2017-12-04 NOTE — PROGRESS NOTES
"  SUBJECTIVE:   Cristina Milton is a 62 year old female who presents to clinic today for the following health issues:      Chief Complaint   Patient presents with     RECHECK     Asthma exacerbation, placed on prednisone     Follow-up from visit 11/30/2017  Her symptoms have improved significantly  She does still have a cough and wheezing, though these are improved  Her insurance covered Advair and she is taking it twice daily and rinsing mouth after  She is also on Augmentin and Prednisone taper  She is not yet taking Zantac due to her insurance not covering it as a prescription  She has not recently needed to take her albuterol rescue inhaler      ROS:  Constitutional, HEENT, cardiovascular, pulmonary, gi and gu systems are negative, except as otherwise noted.      This document serves as a record of the services and decisions personally performed and made by Mela Link MD. It was created on her behalf by Nicky Joseph, a trained medical scribe. The creation of this document is based the provider's statements to the medical scribe.  Nicky Joseph 4:31 PM December 4, 2017  OBJECTIVE:     /72  Pulse 78  Temp 97.1  F (36.2  C) (Oral)  Ht 5' 8\" (1.727 m)  Wt 259 lb (117.5 kg)  SpO2 95%  Breastfeeding? No  BMI 39.38 kg/m2  Body mass index is 39.38 kg/(m^2).     GENERAL APPEARANCE: healthy, alert and no distress  EYES: Eyes grossly normal to inspection, PERRL and conjunctivae and sclerae normal  HENT: ear canals and TM's normal and nose and mouth without ulcers or lesions  NECK: no adenopathy  RESP: lungs clear to auscultation - no rales, rhonchi or wheezes  CV: regular rates and rhythm, normal S1 S2, no S3      ASSESSMENT/PLAN:     Cristina was seen today for recheck.    Diagnoses and all orders for this visit:    Moderate asthma with acute exacerbation, unspecified whether persistent  Significantly improved  Doing well on current treatment plan  Continue course of Augmentin and Prednisone taper  Advised " Zantac while on Prednisone for GI protection  Continue Advair bid with albuterol prn indefinitely   Follow-up if worsening of symptoms      Follow-up with Provider - 6 months    Patient Instructions   You can buy Zantac over the counter  This will protect your stomach while you are taking Prednisone  Continue treatment as prescribed  Follow up in 6 months  Seek sooner medical attention if there is any worsening of symptoms or problems.        The information in this document, created by a scribe for me, accurately reflects the services I personally performed and the decisions made by me. I have reviewed and approved this document for accuracy.  4:39 PM December 4, 2017    Mela Link MD  [facility]

## 2018-02-08 ENCOUNTER — OFFICE VISIT (OUTPATIENT)
Dept: FAMILY MEDICINE | Facility: CLINIC | Age: 63
End: 2018-02-08
Payer: COMMERCIAL

## 2018-02-08 VITALS
HEART RATE: 71 BPM | WEIGHT: 255 LBS | DIASTOLIC BLOOD PRESSURE: 77 MMHG | OXYGEN SATURATION: 94 % | SYSTOLIC BLOOD PRESSURE: 127 MMHG | TEMPERATURE: 96.7 F | BODY MASS INDEX: 38.65 KG/M2 | HEIGHT: 68 IN

## 2018-02-08 DIAGNOSIS — R09.81 CONGESTION OF PARANASAL SINUS: Primary | ICD-10-CM

## 2018-02-08 DIAGNOSIS — R21 RASH: ICD-10-CM

## 2018-02-08 PROCEDURE — 99213 OFFICE O/P EST LOW 20 MIN: CPT | Performed by: NURSE PRACTITIONER

## 2018-02-08 NOTE — PROGRESS NOTES
HPI      SUBJECTIVE:   Cristina Milton is a 62 year old female who presents to clinic today for the following health issues:      RESPIRATORY SYMPTOMS      Duration: 3 days    Description  nasal congestion    Severity: moderate    Accompanying signs and symptoms: facial pain    History (predisposing factors):  none    Precipitating or alleviating factors: Nyquil    Therapies tried and outcome:  nasal spray/wash -       Last 4-5 days not feeling well and sast 2 days has had constant nose blowing with green mucus.  Concerned about a sinus infection because of history of chronic sinus infections.  No tenderness, teeth pain, sore throat, cough present.  Has an occasional twinge in right ear but no ear pain.  C/o congestion. Has used her nasal rinse many times and her nose gets congested again within 10 min. Has used mucinex and sinus max with minimal relief. Has had more anxiety related to work and she thinks this stress has caught up with her causing these symptoms.    She also c/o a rash behind her neck that is extremely itchy.  It comes and goes for last 3 weeks, continually getting worse.  Had another episode of the same rash in the fall that went away. No pain or fevers.      Past Medical History:   Diagnosis Date     Allergic rhinitis due to pollen      Anxiety disorder      Arthritis      Asthma      Depression     since 1998 was on zoloft      Diaphragmatic hernia without mention of obstruction or gangrene      Disorder of bone and cartilage, unspecified     osteopenia     Headache(784.0)      Insomnia, unspecified      Lumbago      Malignant neoplasm of central portion of female breast (H)      Obesity, unspecified      Obstructive sleep apnea (adult) (pediatric)      Osteoarthrosis, unspecified whether generalized or localized, lower leg      Other diseases of lung, not elsewhere classified      PONV (postoperative nausea and vomiting)     vomiting     Recurrent sinus infections      Stomach ulcer     NSAID  use     TMJ disease      Unspecified asthma(493.90)      Unspecified nasal polyp      Unspecified vitamin D deficiency      Past Surgical History:   Procedure Laterality Date     ARTHROPLASTY KNEE Right 2/17/2015    Procedure: ARTHROPLASTY KNEE;  Surgeon: Jermain Gonzalez MD;  Location:  OR     BREAST SURGERY  1995    lumpectomy,early cancer     COLONOSCOPY  2006,2008     COLONOSCOPY N/A 12/15/2016    Procedure: COMBINED COLONOSCOPY, SINGLE OR MULTIPLE BIOPSY/POLYPECTOMY BY BIOPSY;  Surgeon: Gil Sanchez MD;  Location:  GI     ENT SURGERY  2012    dilation esoph stricture     GYN SURGERY  1999    hysterectomy with ooperectomy     GYN SURGERY  1995    conization cervix knife/laser     HYSTERECTOMY TOTAL ABDOMINAL       LAPAROSCOPIC CHOLECYSTECTOMY  11/19/2012    Procedure: LAPAROSCOPIC CHOLECYSTECTOMY;  LAPAROSCOPIC CHOLECYSTECTOMY;  Surgeon: Jama Persaud MD;  Location: [facility]     TONSILLECTOMY       Social History   Substance Use Topics     Smoking status: Never Smoker     Smokeless tobacco: Never Used     Alcohol use 0.0 oz/week     0 Standard drinks or equivalent per week      Comment: rare     Current Outpatient Prescriptions   Medication Sig Dispense Refill     citalopram (CELEXA) 40 MG tablet Take 1 tablet (40 mg) by mouth daily 90 tablet 3     fluticasone-salmeterol (ADVAIR) 250-50 MCG/DOSE diskus inhaler Inhale 1 puff into the lungs 2 times daily 3 Inhaler 3     ranitidine (ZANTAC) 150 MG tablet Take 1 tablet (150 mg) by mouth 2 times daily 60 tablet 1     albuterol (VENTOLIN HFA) 108 (90 BASE) MCG/ACT Inhaler Inhale 2 puffs into the lungs every 4 hours as needed for shortness of breath / dyspnea or wheezing 18 g 1     fluticasone (FLONASE) 50 MCG/ACT spray SHAKE LIQUID AND USE 2 SPRAYS IN EACH NOSTRIL DAILY 48 mL 1     montelukast (SINGULAIR) 10 MG tablet Take 1 tablet (10 mg) by mouth At Bedtime 90 tablet 3     Biotin 1 MG CAPS Take by mouth daily        cholecalciferol (VITAMIN D-3 SUPER  "STRENGTH) 2000 UNITS tablet Take 2,000 Units by mouth       acetaminophen (TYLENOL) 325 MG tablet Take 2 tablets (650 mg) by mouth every 6 hours 100 tablet 0     calcium-vitamin D (CALTRATE) 600-400 MG-UNIT per tablet Take 2 tablets by mouth daily       MULTIPLE VITAMIN PO Take 1 tablet by mouth daily       glucosamine-chondroitin 500-400 MG CAPS Take 2 capsules by mouth daily        Allergies   Allergen Reactions     Cats      Dogs      Dust Mites      Mold      Nsaids      Gastric ulcer     Trees        Reviewed and updated as needed this visit by clinical staff and provider      ROS  Detailed as above     /77 (BP Location: Right arm, Cuff Size: Adult Large)  Pulse 71  Temp 96.7  F (35.9  C) (Oral)  Ht 5' 8\" (1.727 m)  Wt 255 lb (115.7 kg)  SpO2 94%  BMI 38.77 kg/m2      Physical Exam   Constitutional: She is well-developed, well-nourished, and in no distress.   HENT:   Head: Normocephalic.   Right Ear: Tympanic membrane, external ear and ear canal normal.   Left Ear: Tympanic membrane, external ear and ear canal normal.   Nose: Right sinus exhibits no maxillary sinus tenderness and no frontal sinus tenderness. Left sinus exhibits no frontal sinus tenderness.   Mouth/Throat: Uvula is midline, oropharynx is clear and moist and mucous membranes are normal. No oropharyngeal exudate.   Right turbinate pale.     Eyes: Conjunctivae are normal.   Neck: Trachea normal and normal range of motion.   Cardiovascular: Normal rate, regular rhythm and normal heart sounds.    No murmur heard.  Pulmonary/Chest: Effort normal. No respiratory distress. She has wheezes in the right upper field, the right middle field, the right lower field, the left upper field, the left middle field and the left lower field.   Audible wheezing with deep breathing   Lymphadenopathy:     She has no cervical adenopathy.   Neurological: She is alert.   Skin: Skin is warm and dry. Rash noted.   Localized erythematous maculopapuplar rash with " excoriation on left posterior shoulder 16x5cm.   Psychiatric: Affect normal. Her mood appears anxious.   Vitals reviewed.          Assessment and Plan:       ICD-10-CM    1. Congestion of paranasal sinus R09.81    2. Rash R21          Symptoms do not appear to be related to bacterial cause at this time.  Will continue use of flonase, nasal rinses, and singulair to help with symptoms.  Advised to keep a food diary to look for possible associated cause of sinus symptoms due to allergies.  Use OTC steroid cream for rash.    Call or rtc prn       Pt seen in conjunction with Nancy Chavira NP Student    SABINO Hatfield, CNP  [facility]

## 2018-02-08 NOTE — MR AVS SNAPSHOT
"              After Visit Summary   2/8/2018    Cristina Milton    MRN: [id number]           Patient Information     Date Of Birth          1955        Visit Information        Provider Department      2/8/2018 9:00 AM Elysia Reynolds APRN CNP [facility]        Today's Diagnoses     Congestion of paranasal sinus    -  1    Rash           Follow-ups after your visit        Who to contact     If you have questions or need follow up information about today's clinic visit or your schedule please contact [facility] directly at [phone].  Normal or non-critical lab and imaging results will be communicated to you by Every1Mobilehart, letter or phone within 4 business days after the clinic has received the results. If you do not hear from us within 7 days, please contact the clinic through Every1Mobilehart or phone. If you have a critical or abnormal lab result, we will notify you by phone as soon as possible.  Submit refill requests through [employer] Land or call your pharmacy and they will forward the refill request to us. Please allow 3 business days for your refill to be completed.          Additional Information About Your Visit        MyChart Information     [employer] gives you secure access to your electronic health record. If you see a primary care provider, you can also send messages to your care team and make appointments. If you have questions, please call your primary care clinic.  If you do not have a primary care provider, please call [phone] and they will assist you.        Care EveryWhere ID     This is your Care EveryWhere ID. This could be used by other organizations to access your [place] medical records  [id number]        Your Vitals Were     Pulse Temperature Height Pulse Oximetry BMI (Body Mass Index)       71 96.7  F (35.9  C) (Oral) 5' 8\" (1.727 m) 94% 38.77 kg/m2        Blood Pressure from Last 3 Encounters:   02/08/18 127/77   12/04/17 128/72   11/30/17 121/78    Weight from " Last 3 Encounters:   02/08/18 255 lb (115.7 kg)   12/04/17 259 lb (117.5 kg)   11/30/17 258 lb (117 kg)              Today, you had the following     No orders found for display       Primary Care Provider Office Phone # Fax #    Mela ANGELA Link [phone] [address] [facility] [address]  [facility] [address]        Equal Access to Services     [facility] : Hadii aad ku hadasho Sodeliaali, waaxda luqadaha, qaybta kaalmada adeegyada, waxay idiin hayaan adeeg kharacammy lamala . So [facility] [phone].    ATENCIÓN: Si sonyla aba, tiene a araiza disposición servicios gratuitos de asistencia lingüística. Llame al [phone].    We comply with applicable federal civil rights laws and [place] laws. We do not discriminate on the basis of race, color, national origin, age, disability, sex, sexual orientation, or gender identity.            Thank you!     Thank you for choosing [facility]  for your care. Our goal is always to provide you with excellent care. Hearing back from our patients is one way we can continue to improve our services. Please take a few minutes to complete the written survey that you may receive in the mail after your visit with us. Thank you!             Your Updated Medication List - Protect others around you: Learn how to safely use, store and throw away your medicines at www.disposemymeds.org.          This list is accurate as of 2/8/18  1:48 PM.  Always use your most recent med list.                   Brand Name Dispense Instructions for use Diagnosis    acetaminophen 325 MG tablet    TYLENOL    100 tablet    Take 2 tablets (650 mg) by mouth every 6 hours    Arthritis of knee, right       albuterol 108 (90 BASE) MCG/ACT Inhaler    VENTOLIN HFA    18 g    Inhale 2 puffs into the lungs every 4 hours as needed for shortness of breath / dyspnea or wheezing    Mild intermittent asthma without complication       Biotin 1 MG Caps      Take by mouth daily         calcium-vitamin D 600-400 MG-UNIT per tablet    CALTRATE     Take 2 tablets by mouth daily        citalopram 40 MG tablet    celeXA    90 tablet    Take 1 tablet (40 mg) by mouth daily    Recurrent major depressive disorder, in full remission (H)       fluticasone 50 MCG/ACT spray    FLONASE    48 mL    SHAKE LIQUID AND USE 2 SPRAYS IN EACH NOSTRIL DAILY    Seasonal allergic rhinitis       fluticasone-salmeterol 250-50 MCG/DOSE diskus inhaler    ADVAIR    3 Inhaler    Inhale 1 puff into the lungs 2 times daily    Moderate asthma with acute exacerbation, unspecified whether persistent       glucosamine-chondroitin 500-400 MG Caps per capsule      Take 2 capsules by mouth daily        montelukast 10 MG tablet    SINGULAIR    90 tablet    Take 1 tablet (10 mg) by mouth At Bedtime    Mild intermittent asthma without complication       MULTIPLE VITAMIN PO      Take 1 tablet by mouth daily        ranitidine 150 MG tablet    ZANTAC    60 tablet    Take 1 tablet (150 mg) by mouth 2 times daily    Dyspepsia       Vitamin D-3 Super Strength 2000 UNITS tablet   Generic drug:  cholecalciferol      Take 2,000 Units by mouth

## 2018-02-08 NOTE — NURSING NOTE
"Chief Complaint   Patient presents with     Sinus Problem       Initial /77 (BP Location: Right arm, Cuff Size: Adult Large)  Pulse 71  Temp 96.7  F (35.9  C) (Oral)  Ht 5' 8\" (1.727 m)  Wt 255 lb (115.7 kg)  SpO2 94%  BMI 38.77 kg/m2 Estimated body mass index is 38.77 kg/(m^2) as calculated from the following:    Height as of this encounter: 5' 8\" (1.727 m).    Weight as of this encounter: 255 lb (115.7 kg).  Medication Reconciliation: complete         Chung Le      "

## 2018-02-27 ENCOUNTER — TELEPHONE (OUTPATIENT)
Dept: FAMILY MEDICINE | Facility: CLINIC | Age: 63
End: 2018-02-27

## 2018-02-27 DIAGNOSIS — Z88.9 MULTIPLE ALLERGIES: Primary | ICD-10-CM

## 2018-02-27 RX ORDER — PREDNISONE 10 MG/1
TABLET ORAL
Qty: 12 TABLET | Refills: 0 | Status: SHIPPED | OUTPATIENT
Start: 2018-02-27 | End: 2018-06-29

## 2018-02-27 NOTE — TELEPHONE ENCOUNTER
Called to updated patient and advise to call if symptoms worsen/do not improve.     Left detailed VM.       Cira JAIME RN

## 2018-02-27 NOTE — TELEPHONE ENCOUNTER
Triage ,  Please speak to this patient .  She was seen by Elysia.  Find out why she needs prednisone?  What are her symptoms.  Dr.Nasima Nikolay MD

## 2018-02-27 NOTE — TELEPHONE ENCOUNTER
"Spoke with patient:     Denies fever  C/o some coughing- nonproductive  C/o some wheezing, which is cleared with use of her inhaler.     States that team provider states if her \"clogged sinuses\" do not improve, she could try a prednisone burst. Symptoms were improving, but worsened overnight into this morning.     She is so congested that she feels she cannot use CPAP tonight because she cannot breath her nose.     She is keeping a food journal, as Team Provider thought this maybe was an allergy and NOT an infection.     She is using her nasal sprays (flonase and ocean spray), using Mucinex (which doesn't touch it), has taken 24 hour allergy pills with minor relief.     Please advise,   Cira JAIME RN      "

## 2018-02-27 NOTE — TELEPHONE ENCOUNTER
Reason for Call:  Medication or medication refill:    Do you use a [facility]?  Name of the pharmacy and phone number for the current request:  [email or web address] [id number] [place], [place] - [address]      Name of the medication requested: Prednisone     Other request: pt came in a few weeks ago with a possible sinus infection.  She was told that if she does not get better that she could call in a receive a rx for prednisone to get cleared up    Can we leave a detailed message on this number? YES    Phone number patient can be reached at: Cell number on file:    Telephone Information:   Mobile [phone]       Best Time: Any    Call taken on 2/27/2018 at 10:40 AM by Clemencia Martinez

## 2018-02-27 NOTE — TELEPHONE ENCOUNTER
Please let the patient know that script of prednisone is sent to pharmacy.  Follow up if Symptoms doesn't improve or get worse.  Dr.Nasima Nikolay MD

## 2018-05-14 ENCOUNTER — TRANSFERRED RECORDS (OUTPATIENT)
Dept: HEALTH INFORMATION MANAGEMENT | Facility: CLINIC | Age: 63
End: 2018-05-14

## 2018-06-28 NOTE — PROGRESS NOTES
"  SUBJECTIVE:   Cristina Milton is a 63 year old female who presents to clinic today for the following health issues:  Bleeding from vaginal area, during BM's    Abdominal pain in lower pelvic area and in back  Complaint that the blood she is complaining is coming from the vagina and not from the rectal area.  It is that when she moved her bowels.  It happens several times that when she wiped she noticed blood,  There is no primitivo bleeding  She noticed blood clot came out from vagina  She has a history of hysterectomy  No nausea vomiting diarrhea or fever.  Asthma is under good control.  Has followed allergy specialist and had a spirometry done by them  Had spirometry by allergy specialist    Problem list and histories reviewed & adjusted, as indicated.  Additional history: as documented    Labs reviewed in EPIC    Reviewed and updated as needed this visit by clinical staff       Reviewed and updated as needed this visit by Provider         ROS:  Constitutional, neuro, ENT, endocrine, pulmonary, cardiac, gastrointestinal, genitourinary, musculoskeletal, integument and psychiatric systems are negative, except as otherwise noted.    OBJECTIVE:                                                    /72  Pulse 84  Temp 98.1  F (36.7  C) (Oral)  Ht 5' 8\" (1.727 m)  Wt 235 lb (106.6 kg)  SpO2 100%  Breastfeeding? No  BMI 35.73 kg/m2  Body mass index is 35.73 kg/(m^2).  GENERAL APPEARANCE: healthy, alert and no distress  ABDOMEN: soft, nontender, without hepatosplenomegaly or masses and bowel sounds normal   (female): Even though patient has a history of hysterectomy I saw possibly cervix with erosion and it bled when I took the specimen, no abnormal masses were felt on abdominal or pelvic exam but patient is obese so examination is limited and rectal exam normal without masses-  Does have a external hemorrhoid     ASSESSMENT/PLAN:                                                      Cristina was seen today for " rectal problem.    Diagnoses and all orders for this visit:    Vaginal bleeding  -     PAP imaged thin layer, diagnostic  -     Cancel: HPV High Risk Types DNA Cervical  -     OB/GYN REFERRAL  -     CBC with platelets    Cervical cancer screening  -     PAP imaged thin layer, diagnostic  -     Cancel: HPV High Risk Types DNA Cervical  But I did a speculum exam I was able to see her cervix even though she had hysterectomy complete  There was erosion  Bled when I took the specimen  Referred to OB/GYN for further treatment    Cervical erosion  -     PAP imaged thin layer, diagnostic  -     OB/GYN REFERRAL    H/O abdominal hysterectomy  -     PAP imaged thin layer, diagnostic  -     OB/GYN REFERRAL    External hemorrhoids  Bleeding could be from hemorrhoids but she had colonoscopy done which was reviewed  Patient is very confident that this is not from her rectal area  Last colonoscopy on December 15, 2016 and repeat in 5 years was recommended due to tubular adenoma    Mild intermittent asthma without complication  -     fluticasone-salmeterol (ADVAIR) 250-50 MCG/DOSE diskus inhaler; Inhale 1 puff into the lungs 2 times daily  -     montelukast (SINGULAIR) 10 MG tablet; Take 1 tablet (10 mg) by mouth At Bedtime  -     albuterol (VENTOLIN HFA) 108 (90 Base) MCG/ACT Inhaler; Inhale 2 puffs into the lungs every 4 hours as needed for shortness of breath / dyspnea or wheezing    Morbid obesity (H)  She is working on healthy diet and exercise and has lost about 20 pounds.    Recurrent major depressive disorder, in full remission (H)  Doing well with current medication    Tubular adenoma  Seen on colonoscopy dated 2016 and repeat colonoscopy in 5 years was recommended at that time.    Lipid screening  -     Lipid panel reflex to direct LDL Fasting    Screening for diabetes mellitus  -     Glucose    Screening for human immunodeficiency virus  -     HIV Antigen Antibody Combo    Screening for deficiency anemia  -     CBC with  platelets  -     Ferritin    Other orders  -     DEPRESSION ACTION PLAN (DAP)    This note was done by using voice recognition software.        Follow up with Provider -as needed otherwise for yearly physical  Patient Instructions   Labs today  Pap test done  Make appointment with gynecologist.  Follow up as needed      Mela Link MD  [facility]

## 2018-06-29 ENCOUNTER — OFFICE VISIT (OUTPATIENT)
Dept: FAMILY MEDICINE | Facility: CLINIC | Age: 63
End: 2018-06-29
Payer: COMMERCIAL

## 2018-06-29 VITALS
SYSTOLIC BLOOD PRESSURE: 125 MMHG | OXYGEN SATURATION: 100 % | HEART RATE: 84 BPM | DIASTOLIC BLOOD PRESSURE: 72 MMHG | TEMPERATURE: 98.1 F | WEIGHT: 235 LBS | BODY MASS INDEX: 35.61 KG/M2 | HEIGHT: 68 IN

## 2018-06-29 DIAGNOSIS — Z13.220 LIPID SCREENING: ICD-10-CM

## 2018-06-29 DIAGNOSIS — F33.42 RECURRENT MAJOR DEPRESSIVE DISORDER, IN FULL REMISSION (H): ICD-10-CM

## 2018-06-29 DIAGNOSIS — Z13.0 SCREENING FOR DEFICIENCY ANEMIA: ICD-10-CM

## 2018-06-29 DIAGNOSIS — N86 CERVICAL EROSION: ICD-10-CM

## 2018-06-29 DIAGNOSIS — D36.9 TUBULAR ADENOMA: ICD-10-CM

## 2018-06-29 DIAGNOSIS — Z13.1 SCREENING FOR DIABETES MELLITUS: ICD-10-CM

## 2018-06-29 DIAGNOSIS — J45.20 MILD INTERMITTENT ASTHMA WITHOUT COMPLICATION: ICD-10-CM

## 2018-06-29 DIAGNOSIS — Z90.710 H/O ABDOMINAL HYSTERECTOMY: ICD-10-CM

## 2018-06-29 DIAGNOSIS — E66.01 MORBID OBESITY (H): ICD-10-CM

## 2018-06-29 DIAGNOSIS — K64.4 EXTERNAL HEMORRHOIDS: ICD-10-CM

## 2018-06-29 DIAGNOSIS — Z11.51 SCREENING FOR HPV (HUMAN PAPILLOMAVIRUS): ICD-10-CM

## 2018-06-29 DIAGNOSIS — N93.9 VAGINAL BLEEDING: Primary | ICD-10-CM

## 2018-06-29 DIAGNOSIS — Z11.4 SCREENING FOR HUMAN IMMUNODEFICIENCY VIRUS: ICD-10-CM

## 2018-06-29 DIAGNOSIS — Z12.4 CERVICAL CANCER SCREENING: ICD-10-CM

## 2018-06-29 LAB
CHOLEST SERPL-MCNC: 139 MG/DL
ERYTHROCYTE [DISTWIDTH] IN BLOOD BY AUTOMATED COUNT: 13.1 % (ref 10–15)
FERRITIN SERPL-MCNC: 160 NG/ML (ref 8–252)
GLUCOSE SERPL-MCNC: 101 MG/DL (ref 70–99)
HCT VFR BLD AUTO: 44.8 % (ref 35–47)
HDLC SERPL-MCNC: 48 MG/DL
HGB BLD-MCNC: 14.9 G/DL (ref 11.7–15.7)
HIV 1+2 AB+HIV1 P24 AG SERPL QL IA: NONREACTIVE
LDLC SERPL CALC-MCNC: 73 MG/DL
MCH RBC QN AUTO: 32.6 PG (ref 26.5–33)
MCHC RBC AUTO-ENTMCNC: 33.3 G/DL (ref 31.5–36.5)
MCV RBC AUTO: 98 FL (ref 78–100)
NONHDLC SERPL-MCNC: 91 MG/DL
PLATELET # BLD AUTO: 296 10E9/L (ref 150–450)
RBC # BLD AUTO: 4.57 10E12/L (ref 3.8–5.2)
TRIGL SERPL-MCNC: 92 MG/DL
WBC # BLD AUTO: 6.7 10E9/L (ref 4–11)

## 2018-06-29 PROCEDURE — 82728 ASSAY OF FERRITIN: CPT | Performed by: INTERNAL MEDICINE

## 2018-06-29 PROCEDURE — 88175 CYTOPATH C/V AUTO FLUID REDO: CPT | Performed by: INTERNAL MEDICINE

## 2018-06-29 PROCEDURE — 82947 ASSAY GLUCOSE BLOOD QUANT: CPT | Performed by: INTERNAL MEDICINE

## 2018-06-29 PROCEDURE — 99214 OFFICE O/P EST MOD 30 MIN: CPT | Performed by: INTERNAL MEDICINE

## 2018-06-29 PROCEDURE — 80061 LIPID PANEL: CPT | Performed by: INTERNAL MEDICINE

## 2018-06-29 PROCEDURE — 36415 COLL VENOUS BLD VENIPUNCTURE: CPT | Performed by: INTERNAL MEDICINE

## 2018-06-29 PROCEDURE — 87389 HIV-1 AG W/HIV-1&-2 AB AG IA: CPT | Performed by: INTERNAL MEDICINE

## 2018-06-29 PROCEDURE — 85027 COMPLETE CBC AUTOMATED: CPT | Performed by: INTERNAL MEDICINE

## 2018-06-29 RX ORDER — ALBUTEROL SULFATE 90 UG/1
2 AEROSOL, METERED RESPIRATORY (INHALATION) EVERY 4 HOURS PRN
Qty: 18 G | Refills: 1 | Status: SHIPPED | OUTPATIENT
Start: 2018-06-29

## 2018-06-29 RX ORDER — MONTELUKAST SODIUM 10 MG/1
10 TABLET ORAL AT BEDTIME
Qty: 90 TABLET | Refills: 3 | Status: SHIPPED | OUTPATIENT
Start: 2018-06-29

## 2018-06-29 NOTE — MR AVS SNAPSHOT
After Visit Summary   6/29/2018    Cristina Milton    MRN: [id number]           Patient Information     Date Of Birth          1955        Visit Information        Provider Department      6/29/2018 9:30 AM Mela Link MD [facility]        Today's Diagnoses     Vaginal bleeding    -  1    Cervical cancer screening        Cervical erosion        H/O abdominal hysterectomy        External hemorrhoids        Mild intermittent asthma without complication        Morbid obesity (H)        Recurrent major depressive disorder, in full remission (H)        Tubular adenoma        Lipid screening        Screening for diabetes mellitus        Screening for human immunodeficiency virus        Screening for deficiency anemia          Care Instructions    Labs today  Pap test done  Make appointment with gynecologist.  Follow up as needed          Follow-ups after your visit        Additional Services     OB/GYN REFERRAL       Your provider has referred you to:  G: [facility] [phone]  [email or web address]    Please be aware that coverage of these services is subject to the terms and limitations of your health insurance plan.  Call member services at your health plan with any benefit or coverage questions.      Please bring the following with you to your appointment:    (1) Any X-Rays, CTs or MRIs which have been performed.  Contact the facility where they were done to arrange for  prior to your scheduled appointment.   (2) List of current medications   (3) This referral request   (4) Any documents/labs given to you for this referral                  Who to contact     If you have questions or need follow up information about today's clinic visit or your schedule please contact [facility] directly at [phone].  Normal or non-critical lab and imaging results will be communicated to you by MyChart, letter or  "phone within 4 business days after the clinic has received the results. If you do not hear from us within 7 days, please contact the clinic through [employer] or phone. If you have a critical or abnormal lab result, we will notify you by phone as soon as possible.  Submit refill requests through [employer] or call your pharmacy and they will forward the refill request to us. Please allow 3 business days for your refill to be completed.          Additional Information About Your Visit        [employer] Information     [employer] gives you secure access to your electronic health record. If you see a primary care provider, you can also send messages to your care team and make appointments. If you have questions, please call your primary care clinic.  If you do not have a primary care provider, please call [phone] and they will assist you.        Care EveryWhere ID     This is your Care EveryWhere ID. This could be used by other organizations to access your [place] medical records  [id number]        Your Vitals Were     Pulse Temperature Height Pulse Oximetry Breastfeeding? BMI (Body Mass Index)    84 98.1  F (36.7  C) (Oral) 5' 8\" (1.727 m) 100% No 35.73 kg/m2       Blood Pressure from Last 3 Encounters:   06/29/18 125/72   02/08/18 127/77   12/04/17 128/72    Weight from Last 3 Encounters:   06/29/18 235 lb (106.6 kg)   02/08/18 255 lb (115.7 kg)   12/04/17 259 lb (117.5 kg)              We Performed the Following     Asthma Action Plan (AAP)     CBC with platelets     DEPRESSION ACTION PLAN (DAP)     Ferritin     Glucose     HIV Antigen Antibody Combo     HPV High Risk Types DNA Cervical     Lipid panel reflex to direct LDL Fasting     OB/GYN REFERRAL     PAP imaged thin layer, diagnostic          Where to get your medicines      These medications were sent to [id number]  OFELIA MASTERSON - [address] AT [address] [place] [address] & [facility]  [address] [address]    Hours:  24-hours Phone:  " [phone]     albuterol 108 (90 Base) MCG/ACT Inhaler    fluticasone-salmeterol 250-50 MCG/DOSE diskus inhaler    montelukast 10 MG tablet          Primary Care Provider Office Phone # Fax #    Mela ANGELA Link [phone] [address] [facility] [address]  [facility] [address]        Equal Access to Services     [facility] : Hadii aad ku hadasho Sodeliaali, waaxda luqadaha, qaybta kaalmada adeegyada, waxay idiin hayaan adeeg melody lamala . So [facility] [phone].    ATENCIÓN: Si nikita troncoso, tiene a araiza disposición servicios gratuitos de asistencia lingüística. Taviaame al [phone].    We comply with applicable federal civil rights laws and [place] laws. We do not discriminate on the basis of race, color, national origin, age, disability, sex, sexual orientation, or gender identity.            Thank you!     Thank you for choosing [facility]  for your care. Our goal is always to provide you with excellent care. Hearing back from our patients is one way we can continue to improve our services. Please take a few minutes to complete the written survey that you may receive in the mail after your visit with us. Thank you!             Your Updated Medication List - Protect others around you: Learn how to safely use, store and throw away your medicines at www.disposemymeds.org.          This list is accurate as of 6/29/18 10:07 AM.  Always use your most recent med list.                   Brand Name Dispense Instructions for use Diagnosis    acetaminophen 325 MG tablet    TYLENOL    100 tablet    Take 2 tablets (650 mg) by mouth every 6 hours    Arthritis of knee, right       albuterol 108 (90 Base) MCG/ACT Inhaler    VENTOLIN HFA    18 g    Inhale 2 puffs into the lungs every 4 hours as needed for shortness of breath / dyspnea or wheezing    Mild intermittent asthma without complication       Biotin 1 MG Caps      Take by mouth daily        calcium-vitamin D 600-400 MG-UNIT per  tablet    CALTRATE     Take 2 tablets by mouth daily        citalopram 40 MG tablet    celeXA    90 tablet    Take 1 tablet (40 mg) by mouth daily    Recurrent major depressive disorder, in full remission (H)       fluticasone 50 MCG/ACT spray    FLONASE    48 mL    SHAKE LIQUID AND USE 2 SPRAYS IN EACH NOSTRIL DAILY    Seasonal allergic rhinitis       fluticasone-salmeterol 250-50 MCG/DOSE diskus inhaler    ADVAIR    3 Inhaler    Inhale 1 puff into the lungs 2 times daily    Mild intermittent asthma without complication       glucosamine-chondroitin 500-400 MG Caps per capsule      Take 2 capsules by mouth daily        montelukast 10 MG tablet    SINGULAIR    90 tablet    Take 1 tablet (10 mg) by mouth At Bedtime    Mild intermittent asthma without complication       MULTIPLE VITAMIN PO      Take 1 tablet by mouth daily        Vitamin D-3 Super Strength 2000 units tablet   Generic drug:  cholecalciferol      Take 2,000 Units by mouth

## 2018-06-29 NOTE — LETTER
My Asthma Action Plan  Name: Cristina Milton   YOB: 1955  Date: 6/29/2018   My doctor: Mela Link MD   My clinic: [facility]        My Control Medicine: advair and singulair  My Rescue Medicine: albuterol   My Asthma Severity: intermittent  Avoid your asthma triggers: upper respiratory infections, pollens, mold and cold air               GREEN ZONE   Good Control    I feel good    No cough or wheeze    Can work, sleep and play without asthma symptoms       Take your asthma control medicine every day.     1. If exercise triggers your asthma, take your rescue medication    15 minutes before exercise or sports, and    During exercise if you have asthma symptoms  2. Spacer to use with inhaler: If you have a spacer, make sure to use it with your inhaler             YELLOW ZONE Getting Worse  I have ANY of these:    I do not feel good    Cough or wheeze    Chest feels tight    Wake up at night   1. Keep taking your Green Zone medications  2. Start taking your rescue medicine:    every 20 minutes for up to 1 hour. Then every 4 hours for 24-48 hours.  3. If you stay in the Yellow Zone for more than 12-24 hours, contact your doctor.  4. If you do not return to the Green Zone in 12-24 hours or you get worse, start taking your oral steroid medicine if prescribed by your provider.           RED ZONE Medical Alert - Get Help  I have ANY of these:    I feel awful    Medicine is not helping    Breathing getting harder    Trouble walking or talking    Nose opens wide to breathe       1. Take your rescue medicine NOW  2. If your provider has prescribed an oral steroid medicine, start taking it NOW  3. Call your doctor NOW  4. If you are still in the Red Zone after 20 minutes and you have not reached your doctor:    Take your rescue medicine again and    Call 911 or go to the emergency room right away    See your regular doctor within 2 weeks of an Emergency Room or Urgent Care visit for follow-up  treatment.          Annual Reminders:  Meet with Asthma Educator,  Flu Shot in the Fall, consider Pneumonia Vaccination for patients with asthma (aged 19 and older).    Pharmacy: [facility] [id number] [facility] [address]                      Asthma Triggers  How To Control Things That Make Your Asthma Worse    Triggers are things that make your asthma worse.  Look at the list below to help you find your triggers and what you can do about them.  You can help prevent asthma flare-ups by staying away from your triggers.      Trigger                                                          What you can do   Cigarette Smoke  Tobacco smoke can make asthma worse. Do not allow smoking in your home, car or around you.  Be sure no one smokes at a child s day care or school.  If you smoke, ask your health care provider for ways to help you quit.  Ask family members to quit too.  Ask your health care provider for a referral to Quit Plan to help you quit smoking, or call 1-407-250-PLAN.     Colds, Flu, Bronchitis  These are common triggers of asthma. Wash your hands often.  Don t touch your eyes, nose or mouth.  Get a flu shot every year.     Dust Mites  These are tiny bugs that live in cloth or carpet. They are too small to see. Wash sheets and blankets in hot water every week.   Encase pillows and mattress in dust mite proof covers.  Avoid having carpet if you can. If you have carpet, vacuum weekly.   Use a dust mask and HEPA vacuum.   Pollen and Outdoor Mold  Some people are allergic to trees, grass, or weed pollen, or molds. Try to keep your windows closed.  Limit time out doors when pollen count is high.   Ask you health care provider about taking medicine during allergy season.     Animal Dander  Some people are allergic to skin flakes, urine or saliva from pets with fur or feathers. Keep pets with fur or feathers out of your home.    If you can t keep the pet outdoors, then keep  the pet out of your bedroom.  Keep the bedroom door closed.  Keep pets off cloth furniture and away from stuffed toys.     Mice, Rats, and Cockroaches  Some people are allergic to the waste from these pests.   Cover food and garbage.  Clean up spills and food crumbs.  Store grease in the refrigerator.   Keep food out of the bedroom.   Indoor Mold  This can be a trigger if your home has high moisture. Fix leaking faucets, pipes, or other sources of water.   Clean moldy surfaces.  Dehumidify basement if it is damp and smelly.   Smoke, Strong Odors, and Sprays  These can reduce air quality. Stay away from strong odors and sprays, such as perfume, powder, hair spray, paints, smoke incense, paint, cleaning products, candles and new carpet.   Exercise or Sports  Some people with asthma have this trigger. Be active!  Ask your doctor about taking medicine before sports or exercise to prevent symptoms.    Warm up for 5-10 minutes before and after sports or exercise.     Other Triggers of Asthma  Cold air:  Cover your nose and mouth with a scarf.  Sometimes laughing or crying can be a trigger.  Some medicines and food can trigger asthma.

## 2018-06-29 NOTE — LETTER
My Depression Action Plan  Name: Cristina Milton   Date of Birth 1955  Date: 6/29/2018    My doctor: Mela Link   My clinic: [address] [facility] [id number] [phone]          GREEN    ZONE   Good Control    What it looks like:     Things are going generally well. You have normal up s and down s. You may even feel depressed from time to time, but bad moods usually last less than a day.   What you need to do:  1. Continue to care for yourself (see self care plan)  2. Check your depression survival kit and update it as needed  3. Follow your physician s recommendations including any medication.  4. Do not stop taking medication unless you consult with your physician first.           YELLOW         ZONE Getting Worse    What it looks like:     Depression is starting to interfere with your life.     It may be hard to get out of bed; you may be starting to isolate yourself from others.    Symptoms of depression are starting to last most all day and this has happened for several days.     You may have suicidal thoughts but they are not constant.   What you need to do:     1. Call your care team, your response to treatment will improve if you keep your care team informed of your progress. Yellow periods are signs an adjustment may need to be made.     2. Continue your self-care, even if you have to fake it!    3. Talk to someone in your support network    4. Open up your depression survival kit           RED    ZONE Medical Alert - Get Help    What it looks like:     Depression is seriously interfering with your life.     You may experience these or other symptoms: You can t get out of bed most days, can t work or engage in other necessary activities, you have trouble taking care of basic hygiene, or basic responsibilities, thoughts of suicide or death that will not go away, self-injurious behavior.     What you need to do:  1. Call your care team and request  a same-day appointment. If they are not available (weekends or after hours) call your local crisis line, emergency room or 911.            Depression Self Care Plan / Survival Kit    Self-Care for Depression  Here s the deal. Your body and mind are really not as separate as most people think.  What you do and think affects how you feel and how you feel influences what you do and think. This means if you do things that people who feel good do, it will help you feel better.  Sometimes this is all it takes.  There is also a place for medication and therapy depending on how severe your depression is, so be sure to consult with your medical provider and/ or Behavioral Health Consultant if your symptoms are worsening or not improving.     In order to better manage my stress, I will:    Exercise  Get some form of exercise, every day. This will help reduce pain and release endorphins, the  feel good  chemicals in your brain. This is almost as good as taking antidepressants!  This is not the same as joining a gym and then never going! (they count on that by the way ) It can be as simple as just going for a walk or doing some gardening, anything that will get you moving.      Hygiene   Maintain good hygiene (Get out of bed in the morning, Make your bed, Brush your teeth, Take a shower, and Get dressed like you were going to work, even if you are unemployed).  If your clothes don't fit try to get ones that do.    Diet  I will strive to eat foods that are good for me, drink plenty of water, and avoid excessive sugar, caffeine, alcohol, and other mood-altering substances.  Some foods that are helpful in depression are: complex carbohydrates, B vitamins, flaxseed, fish or fish oil, fresh fruits and vegetables.    Psychotherapy  I agree to participate in Individual Therapy (if recommended).    Medication  If prescribed medications, I agree to take them.  Missing doses can result in serious side effects.  I understand that drinking  alcohol, or other illicit drug use, may cause potential side effects.  I will not stop my medication abruptly without first discussing it with my provider.    Staying Connected With Others  I will stay in touch with my friends, family members, and my primary care provider/team.    Use your imagination  Be creative.  We all have a creative side; it doesn t matter if it s oil painting, sand castles, or mud pies! This will also kick up the endorphins.    Witness Beauty  (AKA stop and smell the roses) Take a look outside, even in mid-winter. Notice colors, textures. Watch the squirrels and birds.     Service to others  Be of service to others.  There is always someone else in need.  By helping others we can  get out of ourselves  and remember the really important things.  This also provides opportunities for practicing all the other parts of the program.    Humor  Laugh and be silly!  Adjust your TV habits for less news and crime-drama and more comedy.    Control your stress  Try breathing deep, massage therapy, biofeedback, and meditation. Find time to relax each day.     My support system    Clinic Contact:  Phone number:    Contact 1:  Phone number:    Contact 2:  Phone number:    [employer]/:  Phone number:    Therapist:  Phone number:    Local crisis center:    Phone number:    Other community support:  Phone number:

## 2018-06-30 ASSESSMENT — PATIENT HEALTH QUESTIONNAIRE - PHQ9: SUM OF ALL RESPONSES TO PHQ QUESTIONS 1-9: 11

## 2018-06-30 ASSESSMENT — ASTHMA QUESTIONNAIRES: ACT_TOTALSCORE: 23

## 2018-07-01 NOTE — PROGRESS NOTES
Rob Evans,    This is to inform you regarding your test result.    HIV 1&2 Antibody is negative.  Your total cholesterol is normal.  HDL which is called good cholesterol is slightly low.  Your LDL cholesterol is normal.  This is often call bad cholesterol and high levels increase the risk for heart attacks and strokes.  Your triglycerides are normal.  Ferritin which is iron stores in the body is normal.  Glucose which is your blood sugar is slightly elevated.  CBC result which includes white count Hemoglobin and  Platelet Counts is normal.   Eat low cholesterol low fat  diet and do regular physical activity. Avoid high sugar containing food.        Sincerely,      Dr.Nasima Nikolay MD,FACP

## 2018-07-05 ENCOUNTER — OFFICE VISIT (OUTPATIENT)
Dept: OBGYN | Facility: CLINIC | Age: 63
End: 2018-07-05
Payer: COMMERCIAL

## 2018-07-05 VITALS
WEIGHT: 247 LBS | DIASTOLIC BLOOD PRESSURE: 64 MMHG | HEIGHT: 68 IN | BODY MASS INDEX: 37.44 KG/M2 | SYSTOLIC BLOOD PRESSURE: 100 MMHG

## 2018-07-05 DIAGNOSIS — N86 CERVICAL EROSION: Primary | ICD-10-CM

## 2018-07-05 LAB
COPATH REPORT: NORMAL
PAP: NORMAL

## 2018-07-05 PROCEDURE — 88305 TISSUE EXAM BY PATHOLOGIST: CPT | Performed by: OBSTETRICS & GYNECOLOGY

## 2018-07-05 PROCEDURE — 99203 OFFICE O/P NEW LOW 30 MIN: CPT | Mod: 25 | Performed by: OBSTETRICS & GYNECOLOGY

## 2018-07-05 PROCEDURE — 57500 BIOPSY OF CERVIX: CPT | Performed by: OBSTETRICS & GYNECOLOGY

## 2018-07-05 PROCEDURE — 87624 HPV HI-RISK TYP POOLED RSLT: CPT | Performed by: INTERNAL MEDICINE

## 2018-07-05 ASSESSMENT — ANXIETY QUESTIONNAIRES
IF YOU CHECKED OFF ANY PROBLEMS ON THIS QUESTIONNAIRE, HOW DIFFICULT HAVE THESE PROBLEMS MADE IT FOR YOU TO DO YOUR WORK, TAKE CARE OF THINGS AT HOME, OR GET ALONG WITH OTHER PEOPLE: SOMEWHAT DIFFICULT
6. BECOMING EASILY ANNOYED OR IRRITABLE: MORE THAN HALF THE DAYS
2. NOT BEING ABLE TO STOP OR CONTROL WORRYING: MORE THAN HALF THE DAYS
3. WORRYING TOO MUCH ABOUT DIFFERENT THINGS: MORE THAN HALF THE DAYS
5. BEING SO RESTLESS THAT IT IS HARD TO SIT STILL: NOT AT ALL
1. FEELING NERVOUS, ANXIOUS, OR ON EDGE: SEVERAL DAYS
GAD7 TOTAL SCORE: 8
7. FEELING AFRAID AS IF SOMETHING AWFUL MIGHT HAPPEN: NOT AT ALL

## 2018-07-05 ASSESSMENT — PATIENT HEALTH QUESTIONNAIRE - PHQ9: 5. POOR APPETITE OR OVEREATING: SEVERAL DAYS
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
## 2018-07-05 NOTE — MR AVS SNAPSHOT
"              After Visit Summary   7/5/2018    Cristina Milton    MRN: [id number]           Patient Information     Date Of Birth          1955        Visit Information        Provider Department      7/5/2018 3:30 PM Cammy Rocha MD [facility] [place]        Today's Diagnoses     Cervical erosion    -  1       Follow-ups after your visit        Who to contact     If you have questions or need follow up information about today's clinic visit or your schedule please contact [facility] [place] directly at [phone].  Normal or non-critical lab and imaging results will be communicated to you by Moblynghart, letter or phone within 4 business days after the clinic has received the results. If you do not hear from us within 7 days, please contact the clinic through Moblynghart or phone. If you have a critical or abnormal lab result, we will notify you by phone as soon as possible.  Submit refill requests through [facility] [employer] or call your pharmacy and they will forward the refill request to us. Please allow 3 business days for your refill to be completed.          Additional Information About Your Visit        MyChart Information     [facility] gives you secure access to your electronic health record. If you see a primary care provider, you can also send messages to your care team and make appointments. If you have questions, please call your primary care clinic.  If you do not have a primary care provider, please call [phone] and they will assist you.        Care EveryWhere ID     This is your Care EveryWhere ID. This could be used by other organizations to access your [place] medical records  [id number]        Your Vitals Were     Height BMI (Body Mass Index)                5' 8\" (1.727 m) 37.56 kg/m2           Blood Pressure from Last 3 Encounters:   07/05/18 100/64   06/29/18 125/72   02/08/18 127/77    Weight from Last 3 Encounters:   07/05/18 247 lb (112 kg)   06/29/18 235 " lb (106.6 kg)   02/08/18 255 lb (115.7 kg)              We Performed the Following     Surgical pathology exam        Primary Care Provider Office Phone # Fax #    Mela Link [phone] [address] [place]                [place] [address]        Equal Access to Services     [facility] : Hadii aad ku hadasho Sodeliaali, waaxda luqadaha, qaybta kaalmada adewallyyada, waxay haroonin hayaan adewally escaleraselincammy larandalln . So [facility] [phone].    ATENCIÓN: Si habla español, tiene a araiza disposición servicios gratuitos de asistencia lingüística. Llame al [phone].    We comply with applicable federal civil rights laws and [place] laws. We do not discriminate on the basis of race, color, national origin, age, disability, sex, sexual orientation, or gender identity.            Thank you!     Thank you for choosing [facility] [place]  for your care. Our goal is always to provide you with excellent care. Hearing back from our patients is one way we can continue to improve our services. Please take a few minutes to complete the written survey that you may receive in the mail after your visit with us. Thank you!             Your Updated Medication List - Protect others around you: Learn how to safely use, store and throw away your medicines at www.disposemymeds.org.          This list is accurate as of 7/5/18  4:19 PM.  Always use your most recent med list.                   Brand Name Dispense Instructions for use Diagnosis    acetaminophen 325 MG tablet    TYLENOL    100 tablet    Take 2 tablets (650 mg) by mouth every 6 hours    Arthritis of knee, right       albuterol 108 (90 Base) MCG/ACT Inhaler    VENTOLIN HFA    18 g    Inhale 2 puffs into the lungs every 4 hours as needed for shortness of breath / dyspnea or wheezing    Mild intermittent asthma without complication       Biotin 1 MG Caps      Take by mouth daily        calcium-vitamin D 600-400 MG-UNIT per tablet    CALTRATE      Take 2 tablets by mouth daily        citalopram 40 MG tablet    celeXA    90 tablet    Take 1 tablet (40 mg) by mouth daily    Recurrent major depressive disorder, in full remission (H)       fluticasone 50 MCG/ACT spray    FLONASE    48 mL    SHAKE LIQUID AND USE 2 SPRAYS IN EACH NOSTRIL DAILY    Seasonal allergic rhinitis       fluticasone-salmeterol 250-50 MCG/DOSE diskus inhaler    ADVAIR    3 Inhaler    Inhale 1 puff into the lungs 2 times daily    Mild intermittent asthma without complication       glucosamine-chondroitin 500-400 MG Caps per capsule      Take 2 capsules by mouth daily        montelukast 10 MG tablet    SINGULAIR    90 tablet    Take 1 tablet (10 mg) by mouth At Bedtime    Mild intermittent asthma without complication       MULTIPLE VITAMIN PO      Take 1 tablet by mouth daily        Vitamin D-3 Super Strength 2000 units tablet   Generic drug:  cholecalciferol      Take 2,000 Units by mouth
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
## 2018-07-06 ASSESSMENT — ANXIETY QUESTIONNAIRES: GAD7 TOTAL SCORE: 8

## 2018-07-06 ASSESSMENT — PATIENT HEALTH QUESTIONNAIRE - PHQ9: SUM OF ALL RESPONSES TO PHQ QUESTIONS 1-9: 12

## 2018-07-09 LAB — COPATH REPORT: NORMAL

## 2018-07-10 LAB
FINAL DIAGNOSIS: NORMAL
HPV HR 12 DNA CVX QL NAA+PROBE: NEGATIVE
HPV16 DNA SPEC QL NAA+PROBE: NEGATIVE
HPV18 DNA SPEC QL NAA+PROBE: NEGATIVE
SPECIMEN DESCRIPTION: NORMAL
SPECIMEN SOURCE CVX/VAG CYTO: NORMAL

## 2018-07-13 ENCOUNTER — TRANSFERRED RECORDS (OUTPATIENT)
Dept: HEALTH INFORMATION MANAGEMENT | Facility: CLINIC | Age: 63
End: 2018-07-13

## 2018-07-17 ENCOUNTER — TELEPHONE (OUTPATIENT)
Dept: FAMILY MEDICINE | Facility: CLINIC | Age: 63
End: 2018-07-17

## 2018-07-17 DIAGNOSIS — Z12.4 CERVICAL CANCER SCREENING: ICD-10-CM

## 2018-07-17 NOTE — TELEPHONE ENCOUNTER
Rob Evans's biopsies were normal and her last pap smear was normal. Therefore this pap smear can be her last pap smear as long as she has had three normal pap smears in the last 10 years. If she has not then she should have a repeat pap smear in 3 years until she has had three normal pap smears in 10 consecutive years.    Orlando Rocha MD

## 2018-07-17 NOTE — TELEPHONE ENCOUNTER
Reagan Rocha-     Dr. Link has asked that you please review this 63 yr old pt's recent NIL, Neg HPV pap result from 6/29/18 and advise when you would recommend any repeat pap screening?    Patient was having vaginal bleeding and cervical erosion noted.  Had cervical biopsies done on 7/5/18, both Normal, nothing to treat.    Thank you  Lynette Nissen RN

## 2018-07-17 NOTE — TELEPHONE ENCOUNTER
Diagnostic pap from 6/29/18, 63 yr old = Normal, Negative HPV    Was having vaginal bleeding and cervical erosion noted.  Saw Gyn and had cervical biopsies done on 7/5/18, both Normal, nothing to treat.    Dr. Link- Please advise when you would like next cervical cancer screening?  Thank you  Lynette Nissen RN

## 2018-07-18 PROBLEM — Z12.4 CERVICAL CANCER SCREENING: Status: ACTIVE | Noted: 2018-07-18

## 2018-07-18 NOTE — TELEPHONE ENCOUNTER
Patient has had 4 Normal paps in the last 10 years (2 of them being co-tests).  HM updated to no further paps needed and pap result letter sent to patient.  Lynette Nissen RN

## 2018-08-20 ENCOUNTER — OFFICE VISIT (OUTPATIENT)
Dept: OBGYN | Facility: CLINIC | Age: 63
End: 2018-08-20
Payer: COMMERCIAL

## 2018-08-20 VITALS
BODY MASS INDEX: 36.68 KG/M2 | HEIGHT: 68 IN | SYSTOLIC BLOOD PRESSURE: 104 MMHG | WEIGHT: 242 LBS | DIASTOLIC BLOOD PRESSURE: 70 MMHG

## 2018-08-20 DIAGNOSIS — N93.9 VAGINAL BLEEDING: Primary | ICD-10-CM

## 2018-08-20 PROCEDURE — 99213 OFFICE O/P EST LOW 20 MIN: CPT | Performed by: OBSTETRICS & GYNECOLOGY

## 2018-08-20 NOTE — MR AVS SNAPSHOT
"              After Visit Summary   8/20/2018    Cristina Milton    MRN: [id number]           Patient Information     Date Of Birth          1955        Visit Information        Provider Department      8/20/2018 3:00 PM Cammy Rocha MD [facility] [place]        Today's Diagnoses     Vaginal bleeding    -  1       Follow-ups after your visit        Who to contact     If you have questions or need follow up information about today's clinic visit or your schedule please contact [facility] [place] directly at [phone].  Normal or non-critical lab and imaging results will be communicated to you by Gucashhart, letter or phone within 4 business days after the clinic has received the results. If you do not hear from us within 7 days, please contact the clinic through Gucashhart or phone. If you have a critical or abnormal lab result, we will notify you by phone as soon as possible.  Submit refill requests through [employer] [facility] or call your pharmacy and they will forward the refill request to us. Please allow 3 business days for your refill to be completed.          Additional Information About Your Visit        MyChart Information     [facility] gives you secure access to your electronic health record. If you see a primary care provider, you can also send messages to your care team and make appointments. If you have questions, please call your primary care clinic.  If you do not have a primary care provider, please call [phone] and they will assist you.        Care EveryWhere ID     This is your Care EveryWhere ID. This could be used by other organizations to access your [place] medical records  [id number]        Your Vitals Were     Height BMI (Body Mass Index)                5' 8\" (1.727 m) 36.8 kg/m2           Blood Pressure from Last 3 Encounters:   08/20/18 104/70   07/05/18 100/64   06/29/18 125/72    Weight from Last 3 Encounters:   08/20/18 242 lb (109.8 kg)   07/05/18 " 247 lb (112 kg)   06/29/18 235 lb (106.6 kg)              Today, you had the following     No orders found for display       Primary Care Provider Office Phone # Fax #    Mela Link [phone] [address] [place]                [place] [address]        Equal Access to Services     [facility]: Hadii aad ku hadasho Sodeliaali, waaxda luqadaha, qaybta kaalmada adewallyyada, waxay haroonin hayaan adewally escaleraselincammy hardy . So [facility] [phone].    ATENCIÓN: Si habla español, tiene a araiza disposición servicios gratuitos de asistencia lingüística. LlSelect Medical Specialty [facility] - [facility] [phone].    We comply with applicable federal civil rights laws and [place] laws. We do not discriminate on the basis of race, color, national origin, age, disability, sex, sexual orientation, or gender identity.            Thank you!     Thank you for choosing [facility] [place]  for your care. Our goal is always to provide you with excellent care. Hearing back from our patients is one way we can continue to improve our services. Please take a few minutes to complete the written survey that you may receive in the mail after your visit with us. Thank you!             Your Updated Medication List - Protect others around you: Learn how to safely use, store and throw away your medicines at www.disposemymeds.org.          This list is accurate as of 8/20/18  3:37 PM.  Always use your most recent med list.                   Brand Name Dispense Instructions for use Diagnosis    acetaminophen 325 MG tablet    TYLENOL    100 tablet    Take 2 tablets (650 mg) by mouth every 6 hours    Arthritis of knee, right       albuterol 108 (90 Base) MCG/ACT inhaler    VENTOLIN HFA    18 g    Inhale 2 puffs into the lungs every 4 hours as needed for shortness of breath / dyspnea or wheezing    Mild intermittent asthma without complication       Biotin 1 MG Caps      Take by mouth daily        calcium-vitamin D 600-400 MG-UNIT per tablet     CALTRATE     Take 2 tablets by mouth daily        citalopram 40 MG tablet    celeXA    90 tablet    Take 1 tablet (40 mg) by mouth daily    Recurrent major depressive disorder, in full remission (H)       fluticasone 50 MCG/ACT spray    FLONASE    48 mL    SHAKE LIQUID AND USE 2 SPRAYS IN EACH NOSTRIL DAILY    Seasonal allergic rhinitis       fluticasone-salmeterol 250-50 MCG/DOSE diskus inhaler    ADVAIR    3 Inhaler    Inhale 1 puff into the lungs 2 times daily    Mild intermittent asthma without complication       glucosamine-chondroitin 500-400 MG Caps per capsule      Take 2 capsules by mouth daily        montelukast 10 MG tablet    SINGULAIR    90 tablet    Take 1 tablet (10 mg) by mouth At Bedtime    Mild intermittent asthma without complication       MULTIPLE VITAMIN PO      Take 1 tablet by mouth daily        Vitamin D-3 Super Strength 2000 units tablet   Generic drug:  cholecalciferol      Take 2,000 Units by mouth

## 2018-09-07 ENCOUNTER — TRANSFERRED RECORDS (OUTPATIENT)
Dept: HEALTH INFORMATION MANAGEMENT | Facility: CLINIC | Age: 63
End: 2018-09-07

## 2018-11-09 ENCOUNTER — TRANSFERRED RECORDS (OUTPATIENT)
Dept: HEALTH INFORMATION MANAGEMENT | Facility: CLINIC | Age: 63
End: 2018-11-09

## 2018-12-07 ENCOUNTER — TRANSFERRED RECORDS (OUTPATIENT)
Dept: HEALTH INFORMATION MANAGEMENT | Facility: CLINIC | Age: 63
End: 2018-12-07

## 2019-03-29 DIAGNOSIS — F33.42 RECURRENT MAJOR DEPRESSIVE DISORDER, IN FULL REMISSION (H): ICD-10-CM

## 2019-03-29 NOTE — TELEPHONE ENCOUNTER
"Pending Prescriptions:                       Disp   Refills    citalopram (CELEXA) 40 MG tablet [Pharmac*90 tab*0            Sig: TAKE 1 TABLET(40 MG) BY MOUTH DAILY    citalopram (CELEXA) 40 MG tablet  Last Written Prescription Date:  12/31/18  Last Fill Quantity: 90,  # refills: 0   Last office visit: 6/29/2018 with prescribing provider:  Nikolay    Future Office Visit:      Requested Prescriptions   Pending Prescriptions Disp Refills     citalopram (CELEXA) 40 MG tablet [Pharmacy Med Name: CITALOPRAM 40MG TABLETS] 90 tablet 0     Sig: TAKE 1 TABLET(40 MG) BY MOUTH DAILY    SSRIs Protocol Failed - 3/29/2019 11:07 AM       Failed - PHQ-9 score less than 5 in past 6 months    Please review last PHQ-9 score.          Failed - Recent (6 mo) or future (30 days) visit within the authorizing provider's specialty    Patient had office visit in the last 6 months or has a visit in the next 30 days with authorizing provider or within the authorizing provider's specialty.  See \"Patient Info\" tab in inbasket, or \"Choose Columns\" in Meds & Orders section of the refill encounter.           Passed - Medication is active on med list       Passed - Patient is age 18 or older       Passed - No active pregnancy on record       Passed - No positive pregnancy test in last 12 months          "

## 2019-04-01 RX ORDER — CITALOPRAM HYDROBROMIDE 40 MG/1
TABLET ORAL
Qty: 90 TABLET | Refills: 0 | Status: SHIPPED | OUTPATIENT
Start: 2019-04-01 | End: 2019-07-01

## 2019-04-01 NOTE — TELEPHONE ENCOUNTER
PHQ-9 SCORE 6/29/2018 7/5/2018 4/1/2019   PHQ-9 Total Score MyChart - - 6 (Mild depression)   PHQ-9 Total Score 11 12 6     Prescription approved per [facility] Refill Protocol.  Mary Jo MAK RN

## 2019-07-01 DIAGNOSIS — F33.42 RECURRENT MAJOR DEPRESSIVE DISORDER, IN FULL REMISSION (H): ICD-10-CM

## 2019-07-02 RX ORDER — CITALOPRAM HYDROBROMIDE 40 MG/1
TABLET ORAL
Qty: 90 TABLET | Refills: 0 | Status: SHIPPED | OUTPATIENT
Start: 2019-07-02

## 2019-07-02 NOTE — TELEPHONE ENCOUNTER
Prescription approved per [facility] [place] Refill Protocol.

## 2019-07-02 NOTE — TELEPHONE ENCOUNTER
"Last Written Prescription Date:  4/01/19  Last Fill Quantity: 90 tablet,  # refills: 0   Last office visit: 6/29/2018 with prescribing provider:  Nikolay   Future Office Visit:      [facility] Follow-up to PHQ 6/29/2018 7/5/2018 4/1/2019   PHQ-9 9. Suicide Ideation past 2 weeks Not at all Not at all Not at all     SIENA-7 SCORE 7/5/2018   Total Score 8       Requested Prescriptions   Pending Prescriptions Disp Refills     citalopram (CELEXA) 40 MG tablet [Pharmacy Med Name: CITALOPRAM 40MG TABLETS] 90 tablet 0     Sig: TAKE 1 TABLET(40 MG) BY MOUTH DAILY       SSRIs Protocol Failed - 7/1/2019  4:19 PM        Failed - PHQ-9 score less than 5 in past 6 months     Please review last PHQ-9 score.           Failed - Recent (6 mo) or future (30 days) visit within the authorizing provider's specialty     Patient had office visit in the last 6 months or has a visit in the next 30 days with authorizing provider or within the authorizing provider's specialty.  See \"Patient Info\" tab in inbasket, or \"Choose Columns\" in Meds & Orders section of the refill encounter.            Passed - Medication is active on med list        Passed - Patient is age 18 or older        Passed - No active pregnancy on record        Passed - No positive pregnancy test in last 12 months          "

## 2019-09-28 ENCOUNTER — HEALTH MAINTENANCE LETTER (OUTPATIENT)
Age: 64
End: 2019-09-28

## 2019-10-27 ENCOUNTER — HEALTH MAINTENANCE LETTER (OUTPATIENT)
Age: 64
End: 2019-10-27

## 2020-03-15 ENCOUNTER — HEALTH MAINTENANCE LETTER (OUTPATIENT)
Age: 65
End: 2020-03-15

## 2021-01-10 ENCOUNTER — HEALTH MAINTENANCE LETTER (OUTPATIENT)
Age: 66
End: 2021-01-10

## 2021-05-08 ENCOUNTER — HEALTH MAINTENANCE LETTER (OUTPATIENT)
Age: 66
End: 2021-05-08

## 2021-10-23 ENCOUNTER — HEALTH MAINTENANCE LETTER (OUTPATIENT)
Age: 66
End: 2021-10-23

## 2021-12-19 DIAGNOSIS — Z11.59 ENCOUNTER FOR SCREENING FOR OTHER VIRAL DISEASES: ICD-10-CM

## 2022-01-10 ENCOUNTER — LAB (OUTPATIENT)
Dept: LAB | Facility: CLINIC | Age: 67
End: 2022-01-10
Attending: INTERNAL MEDICINE
Payer: COMMERCIAL

## 2022-01-10 DIAGNOSIS — Z11.59 ENCOUNTER FOR SCREENING FOR OTHER VIRAL DISEASES: ICD-10-CM

## 2022-01-10 PROCEDURE — U0003 INFECTIOUS AGENT DETECTION BY NUCLEIC ACID (DNA OR RNA); SEVERE ACUTE RESPIRATORY SYNDROME CORONAVIRUS 2 (SARS-COV-2) (CORONAVIRUS DISEASE [COVID-19]), AMPLIFIED PROBE TECHNIQUE, MAKING USE OF HIGH THROUGHPUT TECHNOLOGIES AS DESCRIBED BY CMS-2020-01-R: HCPCS

## 2022-01-11 LAB — SARS-COV-2 RNA RESP QL NAA+PROBE: NEGATIVE

## 2022-01-13 ENCOUNTER — HOSPITAL ENCOUNTER (OUTPATIENT)
Facility: CLINIC | Age: 67
Discharge: HOME | End: 2022-01-13
Attending: INTERNAL MEDICINE | Admitting: INTERNAL MEDICINE
Payer: COMMERCIAL

## 2022-01-13 VITALS
BODY MASS INDEX: 37.89 KG/M2 | WEIGHT: 250 LBS | HEIGHT: 68 IN | SYSTOLIC BLOOD PRESSURE: 139 MMHG | DIASTOLIC BLOOD PRESSURE: 81 MMHG | RESPIRATION RATE: 13 BRPM | HEART RATE: 70 BPM | OXYGEN SATURATION: 95 %

## 2022-01-13 LAB — COLONOSCOPY: NORMAL

## 2022-01-13 PROCEDURE — G0500 MOD SEDAT ENDO SERVICE >5YRS: HCPCS | Performed by: INTERNAL MEDICINE

## 2022-01-13 PROCEDURE — 258N000003 HC RX IP 258 OP 636: Performed by: INTERNAL MEDICINE

## 2022-01-13 PROCEDURE — 250N000011 HC RX IP 250 OP 636: Performed by: INTERNAL MEDICINE

## 2022-01-13 PROCEDURE — 45378 DIAGNOSTIC COLONOSCOPY: CPT | Performed by: INTERNAL MEDICINE

## 2022-01-13 PROCEDURE — G0105 COLORECTAL SCRN; HI RISK IND: HCPCS | Performed by: INTERNAL MEDICINE

## 2022-01-13 RX ORDER — FENTANYL CITRATE 50 UG/ML
INJECTION, SOLUTION INTRAMUSCULAR; INTRAVENOUS PRN
Status: COMPLETED | OUTPATIENT
Start: 2022-01-13 | End: 2022-01-13

## 2022-01-13 RX ORDER — SODIUM CHLORIDE 9 MG/ML
INJECTION, SOLUTION INTRAVENOUS CONTINUOUS PRN
Status: COMPLETED | OUTPATIENT
Start: 2022-01-13 | End: 2022-01-13

## 2022-01-13 RX ORDER — ONDANSETRON 2 MG/ML
INJECTION INTRAMUSCULAR; INTRAVENOUS PRN
Status: COMPLETED | OUTPATIENT
Start: 2022-01-13 | End: 2022-01-13

## 2022-01-13 RX ADMIN — SODIUM CHLORIDE 125 ML/HR: 9 INJECTION, SOLUTION INTRAVENOUS at 08:04

## 2022-01-13 RX ADMIN — ONDANSETRON 4 MG: 2 INJECTION INTRAMUSCULAR; INTRAVENOUS at 08:04

## 2022-01-13 RX ADMIN — MIDAZOLAM 2 MG: 1 INJECTION INTRAMUSCULAR; INTRAVENOUS at 08:20

## 2022-01-13 RX ADMIN — MIDAZOLAM 2 MG: 1 INJECTION INTRAMUSCULAR; INTRAVENOUS at 08:17

## 2022-01-13 RX ADMIN — MIDAZOLAM 1 MG: 1 INJECTION INTRAMUSCULAR; INTRAVENOUS at 08:25

## 2022-01-13 RX ADMIN — FENTANYL CITRATE 100 MCG: 50 INJECTION, SOLUTION INTRAMUSCULAR; INTRAVENOUS at 08:17

## 2022-01-13 ASSESSMENT — MIFFLIN-ST. JEOR: SCORE: 1717.49

## 2022-01-13 NOTE — H&P
[facility]  Pre-Endoscopy History and Physical     Cristina Milton MRN# [id number]   YOB: 1955 Age: 66 year old     Date of Procedure: 1/13/2022  Primary care provider: Ramona Cano  Type of Endoscopy: colonoscopy  Reason for Procedure: Screening  Type of Anesthesia Anticipated: Moderate Sedation    HPI:    Cristina is a 66 year old female who will be undergoing the above procedure.      A history and physical has been performed. The patient's medications and allergies have been reviewed. The risks and benefits of the procedure and the sedation options and risks were discussed with the patient.  All questions were answered and informed consent was obtained.      She denies a personal or family history of anesthesia complications or bleeding disorders.     Allergies   Allergen Reactions     Cats      Dogs      Dust Mites      Mold      Nsaids      Gastric ulcer     Trees         Prior to Admission Medications   Prescriptions Last Dose Informant Patient Reported? Taking?   Biotin 1 MG CAPS 1/12/2022 at Unknown time  Yes Yes   Sig: Take by mouth daily    MULTIPLE VITAMIN PO Past Week at Unknown time  Yes Yes   Sig: Take 1 tablet by mouth daily   acetaminophen (TYLENOL) 325 MG tablet 1/12/2022 at Unknown time  No Yes   Sig: Take 2 tablets (650 mg) by mouth every 6 hours   albuterol (VENTOLIN HFA) 108 (90 Base) MCG/ACT Inhaler Past Month at Unknown time  No Yes   Sig: Inhale 2 puffs into the lungs every 4 hours as needed for shortness of breath / dyspnea or wheezing   calcium-vitamin D (CALTRATE) 600-400 MG-UNIT per tablet Past Week at Unknown time  Yes Yes   Sig: Take 2 tablets by mouth daily   cholecalciferol (VITAMIN D-3 SUPER STRENGTH) 2000 UNITS tablet Past Week at Unknown time  Yes Yes   Sig: Take 2,000 Units by mouth   citalopram (CELEXA) 40 MG tablet 1/12/2022 at Unknown time  No Yes   Sig: TAKE 1 TABLET(40 MG) BY MOUTH DAILY   fluticasone (FLONASE) 50 MCG/ACT  spray Past Week at Unknown time  No Yes   Sig: SHAKE LIQUID AND USE 2 SPRAYS IN EACH NOSTRIL DAILY   fluticasone-salmeterol (ADVAIR) 250-50 MCG/DOSE diskus inhaler Past Month at Unknown time  No Yes   Sig: Inhale 1 puff into the lungs 2 times daily   glucosamine-chondroitin 500-400 MG CAPS 1/12/2022 at Unknown time  Yes Yes   Sig: Take 2 capsules by mouth daily    montelukast (SINGULAIR) 10 MG tablet More than a month at Unknown time  No Yes   Sig: Take 1 tablet (10 mg) by mouth At Bedtime      Facility-Administered Medications: None       Patient Active Problem List   Diagnosis     Arthritis of knee, right     Advanced directives, counseling/discussion     Asthma     Right shoulder pain     Depression     Obstructive sleep apnea     Tubular adenoma     BMI> 39     Recurrent major depressive disorder, in full remission (H)     H/O abdominal hysterectomy     Cervical cancer screening        Past Medical History:   Diagnosis Date     Allergic rhinitis due to pollen      Anxiety disorder      Arthritis      Asthma      Depression     since 1998 was on zoloft      Diaphragmatic hernia without mention of obstruction or gangrene      Disorder of bone and cartilage, unspecified     osteopenia     Headache(784.0)      Insomnia, unspecified      Lumbago      Malignant neoplasm of central portion of female breast (H)      Obesity, unspecified      Obstructive sleep apnea (adult) (pediatric)      Osteoarthrosis, unspecified whether generalized or localized, lower leg      Other diseases of lung, not elsewhere classified      PONV (postoperative nausea and vomiting)     vomiting     Recurrent sinus infections      Stomach ulcer     NSAID use     TMJ disease      Unspecified asthma(493.90)      Unspecified nasal polyp      Unspecified vitamin D deficiency         Past Surgical History:   Procedure Laterality Date     ARTHROPLASTY KNEE Right 2/17/2015    Procedure: ARTHROPLASTY KNEE;  Surgeon: Jermain Gonzalez MD;  Location:  " OR     BREAST SURGERY  1995    lumpectomy,early cancer     COLONOSCOPY  2006,2008     COLONOSCOPY N/A 12/15/2016    Procedure: COMBINED COLONOSCOPY, SINGLE OR MULTIPLE BIOPSY/POLYPECTOMY BY BIOPSY;  Surgeon: Gil Sanchez MD;  Location:  GI     ENT SURGERY  2012    dilation esoph stricture     GYN SURGERY  1999    hysterectomy with ooperectomy     GYN SURGERY  1995    conization cervix knife/laser     HYSTERECTOMY TOTAL ABDOMINAL       LAPAROSCOPIC CHOLECYSTECTOMY  11/19/2012    Procedure: LAPAROSCOPIC CHOLECYSTECTOMY;  LAPAROSCOPIC CHOLECYSTECTOMY;  Surgeon: Jama Persaud MD;  Location:  [facility]     TONSILLECTOMY         Social History     Tobacco Use     Smoking status: Never Smoker     Smokeless tobacco: Never Used   Substance Use Topics     Alcohol use: Yes     Alcohol/week: 0.0 standard drinks     Comment: rare       Family History   Problem Relation Age of Onset     Prostate Cancer Father      Lymphoma Sister      Family History Negative Mother      Colon Cancer Maternal Grandfather      Chronic Obstructive Pulmonary Disease Brother      Lymphoma Maternal Grandmother      Breast Cancer No family hx of        REVIEW OF SYSTEMS:     5 point ROS negative except as noted above in HPI, including Gen., Resp., CV, GI &  system review.      PHYSICAL EXAM:   /72   Pulse 78   Resp 16   Ht 1.727 m (5' 8\")   Wt 113.4 kg (250 lb)   SpO2 100%   BMI 38.01 kg/m   Estimated body mass index is 38.01 kg/m  as calculated from the following:    Height as of this encounter: 1.727 m (5' 8\").    Weight as of this encounter: 113.4 kg (250 lb).   GENERAL APPEARANCE: healthy, alert and no distress  MENTAL STATUS: alert  AIRWAY EXAM: Mallampatti Class I (visualization of the soft palate, fauces, uvula, anterior and posterior pillars)  RESP: lungs clear to auscultation - no rales, rhonchi or wheezes  CV: normal S1 S2, no S3 or S4      DIAGNOSTICS:    Not indicated      IMPRESSION   ASA Class 2 - Mild systemic " disease        PLAN:       Plan for colonoscopy. We discussed the risks, benefits and alternatives and the patient wished to proceed.    The above has been forwarded to the consulting provider.      Signed Electronically by: Gil Sanchez MD,MD  January 13, 2022

## 2022-04-09 ENCOUNTER — HEALTH MAINTENANCE LETTER (OUTPATIENT)
Age: 67
End: 2022-04-09

## 2022-06-04 ENCOUNTER — HEALTH MAINTENANCE LETTER (OUTPATIENT)
Age: 67
End: 2022-06-04

## 2022-10-09 ENCOUNTER — HEALTH MAINTENANCE LETTER (OUTPATIENT)
Age: 67
End: 2022-10-09

## 2022-11-26 ENCOUNTER — HEALTH MAINTENANCE LETTER (OUTPATIENT)
Age: 67
End: 2022-11-26

## 2023-01-24 NOTE — NURSING NOTE
"Chief Complaint   Patient presents with     Sinus Problem     Pt c/o upper respiratory and sinus sxs X 6 days.     Urgent Care       Initial /60  Pulse 85  Temp 98.4  F (36.9  C)  Ht 5' 8\" (1.727 m)  Wt 254 lb 6.4 oz (115.4 kg)  SpO2 95%  BMI 38.68 kg/m2 Estimated body mass index is 38.68 kg/(m^2) as calculated from the following:    Height as of this encounter: 5' 8\" (1.727 m).    Weight as of this encounter: 254 lb 6.4 oz (115.4 kg).  Medication Reconciliation: complete    " Vtama Pregnancy And Lactation Text: It is unknown if this medication can cause problems during pregnancy and breastfeeding.

## 2023-04-04 NOTE — PROGRESS NOTES
30 DAY [facility] VISIT    Message left for patient to return call     Assessment: Pt meeting objective benchmarks.     Action plan: Waiting for patient to return call.  and Pt to have 6 month [facility] visit  Patient has a follow up visit with Dr. Slater on 03/02/2017.   Device settings:    EPAP Min Auto CPAP: 7 (CPAP Min Auto CPAP)    EPAP Max Auto CPAP: 15 (CPAP Max Auto CPAP)    Avg EPAP pressure (90th %ile) 14 day average (Debbie): 10.7cm H20    Objective measures: 14 day rolling measures       Compliance   (Goal >70%)  --% compliance greater than four hours rolling average 14 days: 92.8%      Leak   (Goal < 10%)     --Average % of night in large leak Rolling Average 14 days (DEBBIE): 0% last data upload      AHI  (Goal < 5)  --AHI Rolling Average 14 Day: 2.35 last data upload       Usage  (Goal >240)  --Time mask on face 14 day average: 459 min           Refill requested for:      Name from pharmacy: ATORVASTATIN CALCIUM 80 MG Tablet          Will file in chart as: atorvastatin (LIPITOR) 80 MG tablet    Sig: TAKE 1 TABLET EVERY DAY    Disp:  90 tablet    Refills:  0 (Pharmacy requested: Not specified)    Start: 4/3/2023    Class: Eprescribe    Non-formulary For: Mixed hyperlipidemia    Last ordered: 5 months ago by Brandin Mckeon DO Last refill: 1/7/2023    Rx #: [id number]    Hmg CoA Reductase Inhibitors (Statin) Refill Protocol - 12 Month Protocol Passed 04/03/2023 10:36 AM   Protocol Details  Lipid Panel or Direct LDL resulted within last 15 months    Patient is NOT on Gemfibrozil    Seen by prescribing provider or same department within the last 12 months or has a future appt in 3 months - IF FAILED PLEASE LOOK AT CHART REVIEW FOR LAST VISIT AND PROCEED ACCORDINGLY    Request is NOT for Simvastatin 80 mg or Vytorin 10-80 mg    Medication (including dose and sig) on current meds list        Last office visit:   11/28/2022    Pending office visit:  None   Last refill:   10/10/2022    Medication refilled per protocol.

## 2024-01-06 ENCOUNTER — HEALTH MAINTENANCE LETTER (OUTPATIENT)
Age: 69
End: 2024-01-06

## 2024-03-16 ENCOUNTER — HEALTH MAINTENANCE LETTER (OUTPATIENT)
Age: 69
End: 2024-03-16

## (undated) RX ORDER — ONDANSETRON 2 MG/ML
INJECTION INTRAMUSCULAR; INTRAVENOUS
Status: DISPENSED
Start: 2022-01-13

## (undated) RX ORDER — FENTANYL CITRATE 50 UG/ML
INJECTION, SOLUTION INTRAMUSCULAR; INTRAVENOUS
Status: DISPENSED
Start: 2022-01-13